# Patient Record
Sex: FEMALE | Race: WHITE | NOT HISPANIC OR LATINO | URBAN - METROPOLITAN AREA
[De-identification: names, ages, dates, MRNs, and addresses within clinical notes are randomized per-mention and may not be internally consistent; named-entity substitution may affect disease eponyms.]

---

## 2018-06-22 ENCOUNTER — OUTPATIENT (OUTPATIENT)
Dept: OUTPATIENT SERVICES | Facility: HOSPITAL | Age: 43
LOS: 1 days | Discharge: HOME | End: 2018-06-22
Payer: COMMERCIAL

## 2018-06-22 VITALS
HEART RATE: 68 BPM | HEIGHT: 67 IN | RESPIRATION RATE: 16 BRPM | DIASTOLIC BLOOD PRESSURE: 82 MMHG | SYSTOLIC BLOOD PRESSURE: 121 MMHG | WEIGHT: 229.94 LBS | OXYGEN SATURATION: 98 % | TEMPERATURE: 98 F

## 2018-06-22 DIAGNOSIS — Z98.890 OTHER SPECIFIED POSTPROCEDURAL STATES: Chronic | ICD-10-CM

## 2018-06-22 DIAGNOSIS — K21.9 GASTRO-ESOPHAGEAL REFLUX DISEASE WITHOUT ESOPHAGITIS: ICD-10-CM

## 2018-06-22 DIAGNOSIS — Z98.1 ARTHRODESIS STATUS: Chronic | ICD-10-CM

## 2018-06-22 DIAGNOSIS — Z90.710 ACQUIRED ABSENCE OF BOTH CERVIX AND UTERUS: Chronic | ICD-10-CM

## 2018-06-22 DIAGNOSIS — Z46.51 ENCOUNTER FOR FITTING AND ADJUSTMENT OF GASTRIC LAP BAND: Chronic | ICD-10-CM

## 2018-06-22 DIAGNOSIS — E66.01 MORBID (SEVERE) OBESITY DUE TO EXCESS CALORIES: ICD-10-CM

## 2018-06-22 LAB
ANION GAP SERPL CALC-SCNC: 4 MMOL/L — LOW (ref 5–17)
BASOPHILS # BLD AUTO: 0.08 K/UL — SIGNIFICANT CHANGE UP (ref 0–0.2)
BASOPHILS NFR BLD AUTO: 0.8 % — SIGNIFICANT CHANGE UP (ref 0–2)
BUN SERPL-MCNC: 14 MG/DL — SIGNIFICANT CHANGE UP (ref 7–23)
CALCIUM SERPL-MCNC: 8.9 MG/DL — SIGNIFICANT CHANGE UP (ref 8.5–10.1)
CHLORIDE SERPL-SCNC: 104 MMOL/L — SIGNIFICANT CHANGE UP (ref 96–108)
CO2 SERPL-SCNC: 32 MMOL/L — HIGH (ref 22–31)
CREAT SERPL-MCNC: 0.8 MG/DL — SIGNIFICANT CHANGE UP (ref 0.5–1.3)
EOSINOPHIL # BLD AUTO: 0.19 K/UL — SIGNIFICANT CHANGE UP (ref 0–0.5)
EOSINOPHIL NFR BLD AUTO: 2 % — SIGNIFICANT CHANGE UP (ref 0–6)
GLUCOSE SERPL-MCNC: 99 MG/DL — SIGNIFICANT CHANGE UP (ref 70–99)
HCT VFR BLD CALC: 40.1 % — SIGNIFICANT CHANGE UP (ref 34.5–45)
HGB BLD-MCNC: 13.3 G/DL — SIGNIFICANT CHANGE UP (ref 11.5–15.5)
IMM GRANULOCYTES NFR BLD AUTO: 0.8 % — SIGNIFICANT CHANGE UP (ref 0–1.5)
LYMPHOCYTES # BLD AUTO: 2.1 K/UL — SIGNIFICANT CHANGE UP (ref 1–3.3)
LYMPHOCYTES # BLD AUTO: 22.2 % — SIGNIFICANT CHANGE UP (ref 13–44)
MCHC RBC-ENTMCNC: 28.2 PG — SIGNIFICANT CHANGE UP (ref 27–34)
MCHC RBC-ENTMCNC: 33.2 GM/DL — SIGNIFICANT CHANGE UP (ref 32–36)
MCV RBC AUTO: 85 FL — SIGNIFICANT CHANGE UP (ref 80–100)
MONOCYTES # BLD AUTO: 0.44 K/UL — SIGNIFICANT CHANGE UP (ref 0–0.9)
MONOCYTES NFR BLD AUTO: 4.7 % — SIGNIFICANT CHANGE UP (ref 2–14)
NEUTROPHILS # BLD AUTO: 6.57 K/UL — SIGNIFICANT CHANGE UP (ref 1.8–7.4)
NEUTROPHILS NFR BLD AUTO: 69.5 % — SIGNIFICANT CHANGE UP (ref 43–77)
NRBC # BLD: 0 /100 WBCS — SIGNIFICANT CHANGE UP (ref 0–0)
PLATELET # BLD AUTO: 320 K/UL — SIGNIFICANT CHANGE UP (ref 150–400)
POTASSIUM SERPL-MCNC: 4.7 MMOL/L — SIGNIFICANT CHANGE UP (ref 3.5–5.3)
POTASSIUM SERPL-SCNC: 4.7 MMOL/L — SIGNIFICANT CHANGE UP (ref 3.5–5.3)
RBC # BLD: 4.72 M/UL — SIGNIFICANT CHANGE UP (ref 3.8–5.2)
RBC # FLD: 12.3 % — SIGNIFICANT CHANGE UP (ref 10.3–14.5)
SODIUM SERPL-SCNC: 140 MMOL/L — SIGNIFICANT CHANGE UP (ref 135–145)
WBC # BLD: 9.46 K/UL — SIGNIFICANT CHANGE UP (ref 3.8–10.5)
WBC # FLD AUTO: 9.46 K/UL — SIGNIFICANT CHANGE UP (ref 3.8–10.5)

## 2018-06-22 PROCEDURE — 93010 ELECTROCARDIOGRAM REPORT: CPT

## 2018-06-22 PROCEDURE — 71046 X-RAY EXAM CHEST 2 VIEWS: CPT | Mod: 26

## 2018-06-22 NOTE — H&P PST ADULT - PMH
Fibroids    GERD (gastroesophageal reflux disease)    Heart Murmur    Hypothyroidism    Morbid obesity

## 2018-06-22 NOTE — H&P PST ADULT - HISTORY OF PRESENT ILLNESS
42 year old female PMH of hypothyroid; Pt diagnosed morbid obesity presents to PST for upper endoscopy and biopsy

## 2018-06-22 NOTE — H&P PST ADULT - PSH
Admission for adjustment of gastric lap band  2007   Section 2006    H/O bilateral breast reduction surgery  with breast implants   H/O myomectomy  2001  H/O spinal fusion  10/2017  H/O: hysterectomy  2014 Admission for adjustment of gastric lap band  2007   Section 2006    H/O abdominoplasty    H/O bilateral breast reduction surgery  with breast implants   H/O myomectomy  2001  H/O spinal fusion  10/2017  H/O: hysterectomy  2014

## 2018-06-22 NOTE — H&P PST ADULT - NSANTHOSAYNRD_GEN_A_CORE
No. ROSINA screening performed.  STOP BANG Legend: 0-2 = LOW Risk; 3-4 = INTERMEDIATE Risk; 5-8 = HIGH Risk

## 2018-06-22 NOTE — H&P PST ADULT - ASSESSMENT
42 year old female presents to PST for upper endoscopy  1. Endoscopy/ Dr Shirley office will give pt instructions for procedure

## 2018-06-29 RX ORDER — SODIUM CHLORIDE 9 MG/ML
1000 INJECTION, SOLUTION INTRAVENOUS
Qty: 0 | Refills: 0 | Status: DISCONTINUED | OUTPATIENT
Start: 2018-07-02 | End: 2018-07-17

## 2018-06-29 RX ORDER — ONDANSETRON 8 MG/1
4 TABLET, FILM COATED ORAL ONCE
Qty: 0 | Refills: 0 | Status: DISCONTINUED | OUTPATIENT
Start: 2018-07-02 | End: 2018-07-17

## 2018-06-29 RX ORDER — FENTANYL CITRATE 50 UG/ML
50 INJECTION INTRAVENOUS
Qty: 0 | Refills: 0 | Status: DISCONTINUED | OUTPATIENT
Start: 2018-07-02 | End: 2018-07-02

## 2018-06-29 RX ORDER — OXYCODONE HYDROCHLORIDE 5 MG/1
10 TABLET ORAL EVERY 6 HOURS
Qty: 0 | Refills: 0 | Status: DISCONTINUED | OUTPATIENT
Start: 2018-07-02 | End: 2018-07-02

## 2018-07-02 ENCOUNTER — OUTPATIENT (OUTPATIENT)
Dept: OUTPATIENT SERVICES | Facility: HOSPITAL | Age: 43
LOS: 1 days | Discharge: HOME | End: 2018-07-02
Payer: COMMERCIAL

## 2018-07-02 ENCOUNTER — RESULT REVIEW (OUTPATIENT)
Age: 43
End: 2018-07-02

## 2018-07-02 VITALS
OXYGEN SATURATION: 97 % | RESPIRATION RATE: 15 BRPM | SYSTOLIC BLOOD PRESSURE: 135 MMHG | TEMPERATURE: 98 F | DIASTOLIC BLOOD PRESSURE: 83 MMHG | HEART RATE: 89 BPM

## 2018-07-02 VITALS
SYSTOLIC BLOOD PRESSURE: 121 MMHG | OXYGEN SATURATION: 98 % | HEIGHT: 67 IN | HEART RATE: 81 BPM | DIASTOLIC BLOOD PRESSURE: 76 MMHG | TEMPERATURE: 98 F | WEIGHT: 229.06 LBS | RESPIRATION RATE: 15 BRPM
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
## 2018-07-02 DIAGNOSIS — K29.50 UNSPECIFIED CHRONIC GASTRITIS WITHOUT BLEEDING: ICD-10-CM

## 2018-07-02 DIAGNOSIS — Z98.82 BREAST IMPLANT STATUS: ICD-10-CM

## 2018-07-02 DIAGNOSIS — Z98.890 OTHER SPECIFIED POSTPROCEDURAL STATES: Chronic | ICD-10-CM

## 2018-07-02 DIAGNOSIS — Z98.84 BARIATRIC SURGERY STATUS: ICD-10-CM

## 2018-07-02 DIAGNOSIS — R01.1 CARDIAC MURMUR, UNSPECIFIED: ICD-10-CM

## 2018-07-02 DIAGNOSIS — E66.01 MORBID (SEVERE) OBESITY DUE TO EXCESS CALORIES: ICD-10-CM

## 2018-07-02 DIAGNOSIS — K22.10 ULCER OF ESOPHAGUS WITHOUT BLEEDING: ICD-10-CM

## 2018-07-02 DIAGNOSIS — Z98.1 ARTHRODESIS STATUS: ICD-10-CM

## 2018-07-02 DIAGNOSIS — Z90.710 ACQUIRED ABSENCE OF BOTH CERVIX AND UTERUS: Chronic | ICD-10-CM

## 2018-07-02 DIAGNOSIS — K21.9 GASTRO-ESOPHAGEAL REFLUX DISEASE WITHOUT ESOPHAGITIS: ICD-10-CM
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
## 2018-07-02 DIAGNOSIS — E03.9 HYPOTHYROIDISM, UNSPECIFIED: ICD-10-CM

## 2018-07-02 DIAGNOSIS — Z46.51 ENCOUNTER FOR FITTING AND ADJUSTMENT OF GASTRIC LAP BAND: Chronic | ICD-10-CM

## 2018-07-02 DIAGNOSIS — Z98.1 ARTHRODESIS STATUS: Chronic | ICD-10-CM

## 2018-07-02 DIAGNOSIS — Z90.710 ACQUIRED ABSENCE OF BOTH CERVIX AND UTERUS: ICD-10-CM

## 2018-07-02 DIAGNOSIS — K95.09 OTHER COMPLICATIONS OF GASTRIC BAND PROCEDURE: ICD-10-CM

## 2018-07-02 LAB
ALLERGY+IMMUNOLOGY DIAG STUDY NOTE: SIGNIFICANT CHANGE UP
HCG UR QL: NEGATIVE — SIGNIFICANT CHANGE UP

## 2018-07-02 PROCEDURE — 43774 LAP RMVL GASTR ADJ ALL PARTS: CPT | Mod: AS

## 2018-07-02 PROCEDURE — 88313 SPECIAL STAINS GROUP 2: CPT | Mod: 26

## 2018-07-02 PROCEDURE — 88305 TISSUE EXAM BY PATHOLOGIST: CPT | Mod: 26

## 2018-07-02 PROCEDURE — 88312 SPECIAL STAINS GROUP 1: CPT | Mod: 26

## 2018-07-02 RX ADMIN — FENTANYL CITRATE 50 MICROGRAM(S): 50 INJECTION INTRAVENOUS at 12:11

## 2018-07-02 RX ADMIN — FENTANYL CITRATE 50 MICROGRAM(S): 50 INJECTION INTRAVENOUS at 11:59

## 2018-07-02 NOTE — ASU DISCHARGE PLAN (ADULT/PEDIATRIC). - MEDICATION SUMMARY - MEDICATIONS TO TAKE
I will START or STAY ON the medications listed below when I get home from the hospital:    Tums 500 mg oral tablet, chewable  -- 1 tab(s) by mouth once a day, As Needed  -- Indication: For home medication    Mylanta oral suspension  -- 10 milliliter(s) by mouth 4 times a day (after meals and at bedtime), As Needed  -- Indication: For home medication    Synthroid 175 mcg (0.175 mg) oral tablet  -- 1 tab(s) by mouth once a day  -- Indication: For home medication

## 2018-07-02 NOTE — BRIEF OPERATIVE NOTE - PROCEDURE
<<-----Click on this checkbox to enter Procedure Endoscopy  07/02/2018    Active  ENASTRO  Laparoscopic removal of gastric band and port  07/02/2018    Active  ENASTRO

## 2018-07-02 NOTE — ASU PATIENT PROFILE, ADULT - VISION (WITH CORRECTIVE LENSES IF THE PATIENT USUALLY WEARS THEM):
Progressives/Partially impaired: cannot see medication labels or newsprint, but can see obstacles in path, and the surrounding layout; can count fingers at arm's length

## 2018-07-02 NOTE — ASU DISCHARGE PLAN (ADULT/PEDIATRIC). - NURSING INSTRUCTIONS
For any problems or concerns, contact your doctor.  If you cannot reach the doctor, call [facility] Emergency Department at [phone] or go to your local Emergency Department.    A responsible adult should be with you for the rest of the day and night for your safety and to help you if you needed. Resume your medications as listed on the attached Medication Record.    Use over the counter stool softener to relieve any constipation you may experience from the pain medication. Increase clear fluids for the next few days. Ambulation is a key part of recovery, so keep moving!

## 2018-07-02 NOTE — BRIEF OPERATIVE NOTE - PRE-OP DX
Dysphagia, unspecified type  07/02/2018  with noted possible slippage of gastric band  Active  Radha Butterfield

## 2018-07-02 NOTE — ASU PATIENT PROFILE, ADULT - PSH
Admission for adjustment of gastric lap band  2007   Section 2006    H/O abdominoplasty    H/O bilateral breast reduction surgery  with breast implants   H/O myomectomy  2001  H/O spinal fusion  10/2017  H/O: hysterectomy  2014

## 2018-07-05 LAB — SURGICAL PATHOLOGY FINAL REPORT - CH: SIGNIFICANT CHANGE UP

## 2018-10-29 PROBLEM — K21.9 GASTRO-ESOPHAGEAL REFLUX DISEASE WITHOUT ESOPHAGITIS: Chronic | Status: ACTIVE | Noted: 2018-06-22

## 2018-11-09 ENCOUNTER — OUTPATIENT (OUTPATIENT)
Dept: OUTPATIENT SERVICES | Facility: HOSPITAL | Age: 43
LOS: 1 days | Discharge: HOME | End: 2018-11-09

## 2018-11-09 VITALS
WEIGHT: 272.05 LBS | RESPIRATION RATE: 16 BRPM | OXYGEN SATURATION: 98 % | TEMPERATURE: 99 F | SYSTOLIC BLOOD PRESSURE: 132 MMHG | HEIGHT: 67 IN | HEART RATE: 78 BPM | DIASTOLIC BLOOD PRESSURE: 78 MMHG

## 2018-11-09 DIAGNOSIS — Z98.890 OTHER SPECIFIED POSTPROCEDURAL STATES: Chronic | ICD-10-CM

## 2018-11-09 DIAGNOSIS — Z46.51 ENCOUNTER FOR FITTING AND ADJUSTMENT OF GASTRIC LAP BAND: Chronic | ICD-10-CM

## 2018-11-09 DIAGNOSIS — Z29.9 ENCOUNTER FOR PROPHYLACTIC MEASURES, UNSPECIFIED: ICD-10-CM

## 2018-11-09 DIAGNOSIS — Z98.1 ARTHRODESIS STATUS: Chronic | ICD-10-CM

## 2018-11-09 DIAGNOSIS — Z90.710 ACQUIRED ABSENCE OF BOTH CERVIX AND UTERUS: Chronic | ICD-10-CM

## 2018-11-09 DIAGNOSIS — E66.01 MORBID (SEVERE) OBESITY DUE TO EXCESS CALORIES: ICD-10-CM

## 2018-11-09 LAB
ALBUMIN SERPL ELPH-MCNC: 4.1 G/DL — SIGNIFICANT CHANGE UP (ref 3.3–5)
ANION GAP SERPL CALC-SCNC: 8 MMOL/L — SIGNIFICANT CHANGE UP (ref 5–17)
APPEARANCE UR: CLEAR — SIGNIFICANT CHANGE UP
APTT BLD: 33.6 SEC — SIGNIFICANT CHANGE UP (ref 27.5–36.3)
BACTERIA # UR AUTO: ABNORMAL
BASOPHILS # BLD AUTO: 0.07 K/UL — SIGNIFICANT CHANGE UP (ref 0–0.2)
BASOPHILS NFR BLD AUTO: 0.7 % — SIGNIFICANT CHANGE UP (ref 0–2)
BILIRUB UR-MCNC: NEGATIVE — SIGNIFICANT CHANGE UP
BLD GP AB SCN SERPL QL: SIGNIFICANT CHANGE UP
BUN SERPL-MCNC: 17 MG/DL — SIGNIFICANT CHANGE UP (ref 7–23)
CALCIUM SERPL-MCNC: 9.6 MG/DL — SIGNIFICANT CHANGE UP (ref 8.5–10.1)
CHLORIDE SERPL-SCNC: 103 MMOL/L — SIGNIFICANT CHANGE UP (ref 96–108)
CO2 SERPL-SCNC: 28 MMOL/L — SIGNIFICANT CHANGE UP (ref 22–31)
COHGB MFR BLDV: 2.4 % — HIGH (ref 0–1.5)
COLOR SPEC: YELLOW — SIGNIFICANT CHANGE UP
CREAT SERPL-MCNC: 0.74 MG/DL — SIGNIFICANT CHANGE UP (ref 0.5–1.3)
DIFF PNL FLD: ABNORMAL
EOSINOPHIL # BLD AUTO: 0.18 K/UL — SIGNIFICANT CHANGE UP (ref 0–0.5)
EOSINOPHIL NFR BLD AUTO: 1.8 % — SIGNIFICANT CHANGE UP (ref 0–6)
EPI CELLS # UR: SIGNIFICANT CHANGE UP
GLUCOSE SERPL-MCNC: 103 MG/DL — HIGH (ref 70–99)
GLUCOSE UR QL: NEGATIVE MG/DL — SIGNIFICANT CHANGE UP
HCT VFR BLD CALC: 39.2 % — SIGNIFICANT CHANGE UP (ref 34.5–45)
HGB BLD-MCNC: 12.9 G/DL — SIGNIFICANT CHANGE UP (ref 11.5–15.5)
IMM GRANULOCYTES NFR BLD AUTO: 0.7 % — SIGNIFICANT CHANGE UP (ref 0–1.5)
INR BLD: 0.99 RATIO — SIGNIFICANT CHANGE UP (ref 0.88–1.16)
KETONES UR-MCNC: NEGATIVE — SIGNIFICANT CHANGE UP
LEUKOCYTE ESTERASE UR-ACNC: NEGATIVE — SIGNIFICANT CHANGE UP
LYMPHOCYTES # BLD AUTO: 2.02 K/UL — SIGNIFICANT CHANGE UP (ref 1–3.3)
LYMPHOCYTES # BLD AUTO: 20.8 % — SIGNIFICANT CHANGE UP (ref 13–44)
MCHC RBC-ENTMCNC: 28.1 PG — SIGNIFICANT CHANGE UP (ref 27–34)
MCHC RBC-ENTMCNC: 32.9 GM/DL — SIGNIFICANT CHANGE UP (ref 32–36)
MCV RBC AUTO: 85.4 FL — SIGNIFICANT CHANGE UP (ref 80–100)
MONOCYTES # BLD AUTO: 0.55 K/UL — SIGNIFICANT CHANGE UP (ref 0–0.9)
MONOCYTES NFR BLD AUTO: 5.7 % — SIGNIFICANT CHANGE UP (ref 2–14)
NEUTROPHILS # BLD AUTO: 6.84 K/UL — SIGNIFICANT CHANGE UP (ref 1.8–7.4)
NEUTROPHILS NFR BLD AUTO: 70.3 % — SIGNIFICANT CHANGE UP (ref 43–77)
NITRITE UR-MCNC: NEGATIVE — SIGNIFICANT CHANGE UP
NRBC # BLD: 0 /100 WBCS — SIGNIFICANT CHANGE UP (ref 0–0)
PH UR: 6.5 — SIGNIFICANT CHANGE UP (ref 5–8)
PLATELET # BLD AUTO: 290 K/UL — SIGNIFICANT CHANGE UP (ref 150–400)
POTASSIUM SERPL-MCNC: 4.7 MMOL/L — SIGNIFICANT CHANGE UP (ref 3.5–5.3)
POTASSIUM SERPL-SCNC: 4.7 MMOL/L — SIGNIFICANT CHANGE UP (ref 3.5–5.3)
PROT UR-MCNC: NEGATIVE MG/DL — SIGNIFICANT CHANGE UP
PROTHROM AB SERPL-ACNC: 11 SEC — SIGNIFICANT CHANGE UP (ref 10–12.9)
RBC # BLD: 4.59 M/UL — SIGNIFICANT CHANGE UP (ref 3.8–5.2)
RBC # FLD: 12.6 % — SIGNIFICANT CHANGE UP (ref 10.3–14.5)
RBC CASTS # UR COMP ASSIST: ABNORMAL /HPF (ref 0–4)
SODIUM SERPL-SCNC: 139 MMOL/L — SIGNIFICANT CHANGE UP (ref 135–145)
SP GR SPEC: 1.01 — SIGNIFICANT CHANGE UP (ref 1.01–1.02)
TYPE + AB SCN PNL BLD: SIGNIFICANT CHANGE UP
UROBILINOGEN FLD QL: NEGATIVE MG/DL — SIGNIFICANT CHANGE UP
WBC # BLD: 9.73 K/UL — SIGNIFICANT CHANGE UP (ref 3.8–10.5)
WBC # FLD AUTO: 9.73 K/UL — SIGNIFICANT CHANGE UP (ref 3.8–10.5)
WBC UR QL: SIGNIFICANT CHANGE UP

## 2018-11-09 NOTE — H&P PST ADULT - ASSESSMENT
43 year old female presents to PST for laparoscopic sleeve gastrectomy liver biopsy intraoperative endoscopy     Plan:  1. PST instructions given ; NPO post midnight   2. Pt instructed to take following meds with sip of water : synthroid   3. EZ wash instructions given   4. Medical Evaluation with Dr Moynihan CAPRINI SCORE [CLOT]    AGE RELATED RISK FACTORS                                                       MOBILITY RELATED FACTORS  [ x] Age 41-60 years                                            (1 Point)                  [ ] Bed rest                                                        (1 Point)  [ ] Age: 61-74 years                                           (2 Points)                 [ ] Plaster cast                                                   (2 Points)  [ ] Age= 75 years                                              (3 Points)                 [ ] Bed bound for more than 72 hours                 (2 Points)    DISEASE RELATED RISK FACTORS                                               GENDER SPECIFIC FACTORS  [ ] Edema in the lower extremities                       (1 Point)                  [ ] Pregnancy                                                     (1 Point)  [ ] Varicose veins                                               (1 Point)                  [ ] Post-partum < 6 weeks                                   (1 Point)             [ x] BMI > 25 Kg/m2                                            (1 Point)                  [ ] Hormonal therapy  or oral contraception          (1 Point)                 [ ] Sepsis (in the previous month)                        (1 Point)                  [ ] History of pregnancy complications                 (1 point)  [ ] Pneumonia or serious lung disease                                               [ ] Unexplained or recurrent                     (1 Point)           (in the previous month)                               (1 Point)  [ ] Abnormal pulmonary function test                     (1 Point)                 SURGERY RELATED RISK FACTORS  [ ] Acute myocardial infarction                              (1 Point)                 [ ]  Section                                             (1 Point)  [ ] Congestive heart failure (in the previous month)  (1 Point)               [ ] Minor surgery                                                  (1 Point)   [ ] Inflammatory bowel disease                             (1 Point)                 [ ] Arthroscopic surgery                                        (2 Points)  [ ] Central venous access                                      (2 Points)                [x ] General surgery lasting more than 45 minutes   (2 Points)       [ ] Stroke (in the previous month)                          (5 Points)               [ ] Elective arthroplasty                                         (5 Points)            ( ) past and present malignancy                             ( 2 points)                                                                                                                                    HEMATOLOGY RELATED FACTORS                                                 TRAUMA RELATED RISK FACTORS  [ ] Prior episodes of VTE                                     (3 Points)                 [ ] Fracture of the hip, pelvis, or leg                       (5 Points)  [ ] Positive family history for VTE                         (3 Points)                 [ ] Acute spinal cord injury (in the previous month)  (5 Points)  [ ] Prothrombin 44338 A                                     (3 Points)                 [ ] Paralysis  (less than 1 month)                             (5 Points)  [ ] Factor V Leiden                                             (3 Points)                  [ ] Multiple Trauma within 1 month                        (5 Points)  [ ] Lupus anticoagulants                                     (3 Points)                                                           [ ] Anticardiolipin antibodies                               (3 Points)                                                       [ ] High homocysteine in the blood                      (3 Points)                                             [ ] Other congenital or acquired thrombophilia      (3 Points)                                                [ ] Heparin induced thrombocytopenia                  (3 Points)                                          Total Score [        4  ]

## 2018-11-09 NOTE — H&P PST ADULT - PSH
Admission for adjustment of gastric lap band  2007 removal 2018   Section     H/O abdominoplasty    H/O bilateral breast reduction surgery  with breast implants  silicone  H/O myomectomy  2001  H/O spinal fusion  10/2017  H/O: hysterectomy  2014 fibroids

## 2018-11-09 NOTE — H&P PST ADULT - HISTORY OF PRESENT ILLNESS
43 year old female PMH of hypothyroid; Pt diagnosed morbid obesity presents to PST for laparoscopic sleeve gastrectomy liver biopsy intraoperative endoscopy

## 2018-11-16 RX ORDER — NALOXONE HYDROCHLORIDE 4 MG/.1ML
0.1 SPRAY NASAL
Qty: 0 | Refills: 0 | Status: DISCONTINUED | OUTPATIENT
Start: 2018-11-19 | End: 2018-11-21

## 2018-11-16 RX ORDER — HYDROMORPHONE HYDROCHLORIDE 2 MG/ML
30 INJECTION INTRAMUSCULAR; INTRAVENOUS; SUBCUTANEOUS
Qty: 0 | Refills: 0 | Status: DISCONTINUED | OUTPATIENT
Start: 2018-11-19 | End: 2018-11-20

## 2018-11-16 RX ORDER — HYDROMORPHONE HYDROCHLORIDE 2 MG/ML
0.5 INJECTION INTRAMUSCULAR; INTRAVENOUS; SUBCUTANEOUS
Qty: 0 | Refills: 0 | Status: DISCONTINUED | OUTPATIENT
Start: 2018-11-19 | End: 2018-11-20

## 2018-11-16 RX ORDER — ONDANSETRON 8 MG/1
4 TABLET, FILM COATED ORAL EVERY 6 HOURS
Qty: 0 | Refills: 0 | Status: DISCONTINUED | OUTPATIENT
Start: 2018-11-19 | End: 2018-11-21

## 2018-11-16 RX ORDER — SODIUM CHLORIDE 9 MG/ML
1000 INJECTION, SOLUTION INTRAVENOUS
Qty: 0 | Refills: 0 | Status: DISCONTINUED | OUTPATIENT
Start: 2018-11-19 | End: 2018-11-19

## 2018-11-19 ENCOUNTER — RESULT REVIEW (OUTPATIENT)
Age: 43
End: 2018-11-19

## 2018-11-19 ENCOUNTER — INPATIENT (INPATIENT)
Facility: HOSPITAL | Age: 43
LOS: 1 days | Discharge: HOME | End: 2018-11-21
Attending: SURGERY | Admitting: SURGERY
Payer: COMMERCIAL

## 2018-11-19 VITALS
HEART RATE: 71 BPM | DIASTOLIC BLOOD PRESSURE: 76 MMHG | RESPIRATION RATE: 16 BRPM | WEIGHT: 261.91 LBS | TEMPERATURE: 99 F | HEIGHT: 67 IN | SYSTOLIC BLOOD PRESSURE: 129 MMHG | OXYGEN SATURATION: 99 %

## 2018-11-19 DIAGNOSIS — Z90.710 ACQUIRED ABSENCE OF BOTH CERVIX AND UTERUS: Chronic | ICD-10-CM

## 2018-11-19 DIAGNOSIS — Z46.51 ENCOUNTER FOR FITTING AND ADJUSTMENT OF GASTRIC LAP BAND: Chronic | ICD-10-CM

## 2018-11-19 DIAGNOSIS — Z98.890 OTHER SPECIFIED POSTPROCEDURAL STATES: Chronic | ICD-10-CM

## 2018-11-19 DIAGNOSIS — Z98.1 ARTHRODESIS STATUS: Chronic | ICD-10-CM

## 2018-11-19 PROCEDURE — 88305 TISSUE EXAM BY PATHOLOGIST: CPT | Mod: 26

## 2018-11-19 RX ORDER — HEPARIN SODIUM 5000 [USP'U]/ML
5000 INJECTION INTRAVENOUS; SUBCUTANEOUS ONCE
Qty: 0 | Refills: 0 | Status: COMPLETED | OUTPATIENT
Start: 2018-11-19 | End: 2018-11-19

## 2018-11-19 RX ORDER — OXYCODONE HYDROCHLORIDE 5 MG/1
10 TABLET ORAL ONCE
Qty: 0 | Refills: 0 | Status: DISCONTINUED | OUTPATIENT
Start: 2018-11-19 | End: 2018-11-19

## 2018-11-19 RX ORDER — ENOXAPARIN SODIUM 100 MG/ML
40 INJECTION SUBCUTANEOUS EVERY 12 HOURS
Qty: 0 | Refills: 0 | Status: DISCONTINUED | OUTPATIENT
Start: 2018-11-19 | End: 2018-11-21

## 2018-11-19 RX ORDER — LEVOTHYROXINE SODIUM 125 MCG
90 TABLET ORAL AT BEDTIME
Qty: 0 | Refills: 0 | Status: DISCONTINUED | OUTPATIENT
Start: 2018-11-19 | End: 2018-11-21

## 2018-11-19 RX ORDER — PANTOPRAZOLE SODIUM 20 MG/1
40 TABLET, DELAYED RELEASE ORAL EVERY 24 HOURS
Qty: 0 | Refills: 0 | Status: DISCONTINUED | OUTPATIENT
Start: 2018-11-19 | End: 2018-11-21

## 2018-11-19 RX ORDER — SODIUM CHLORIDE 9 MG/ML
1000 INJECTION, SOLUTION INTRAVENOUS
Qty: 0 | Refills: 0 | Status: DISCONTINUED | OUTPATIENT
Start: 2018-11-19 | End: 2018-11-21

## 2018-11-19 RX ORDER — APREPITANT 80 MG/1
40 CAPSULE ORAL ONCE
Qty: 0 | Refills: 0 | Status: COMPLETED | OUTPATIENT
Start: 2018-11-19 | End: 2018-11-19

## 2018-11-19 RX ADMIN — HEPARIN SODIUM 5000 UNIT(S): 5000 INJECTION INTRAVENOUS; SUBCUTANEOUS at 06:41

## 2018-11-19 RX ADMIN — HYDROMORPHONE HYDROCHLORIDE 0.5 MILLIGRAM(S): 2 INJECTION INTRAMUSCULAR; INTRAVENOUS; SUBCUTANEOUS at 10:27

## 2018-11-19 RX ADMIN — OXYCODONE HYDROCHLORIDE 10 MILLIGRAM(S): 5 TABLET ORAL at 06:45

## 2018-11-19 RX ADMIN — HYDROMORPHONE HYDROCHLORIDE 30 MILLILITER(S): 2 INJECTION INTRAMUSCULAR; INTRAVENOUS; SUBCUTANEOUS at 10:07

## 2018-11-19 RX ADMIN — SODIUM CHLORIDE 150 MILLILITER(S): 9 INJECTION, SOLUTION INTRAVENOUS at 22:51

## 2018-11-19 RX ADMIN — PANTOPRAZOLE SODIUM 40 MILLIGRAM(S): 20 TABLET, DELAYED RELEASE ORAL at 10:11

## 2018-11-19 RX ADMIN — ENOXAPARIN SODIUM 40 MILLIGRAM(S): 100 INJECTION SUBCUTANEOUS at 17:48

## 2018-11-19 RX ADMIN — SODIUM CHLORIDE 150 MILLILITER(S): 9 INJECTION, SOLUTION INTRAVENOUS at 10:12

## 2018-11-19 RX ADMIN — HYDROMORPHONE HYDROCHLORIDE 0.5 MILLIGRAM(S): 2 INJECTION INTRAMUSCULAR; INTRAVENOUS; SUBCUTANEOUS at 10:14

## 2018-11-19 RX ADMIN — OXYCODONE HYDROCHLORIDE 10 MILLIGRAM(S): 5 TABLET ORAL at 06:42

## 2018-11-19 RX ADMIN — SODIUM CHLORIDE 150 MILLILITER(S): 9 INJECTION, SOLUTION INTRAVENOUS at 17:53

## 2018-11-19 RX ADMIN — APREPITANT 40 MILLIGRAM(S): 80 CAPSULE ORAL at 06:41

## 2018-11-19 NOTE — BRIEF OPERATIVE NOTE - POST-OP DX
Hiatal hernia  11/19/2018    Hemal Saez  Obesity, Class III, BMI 40-49.9 (morbid obesity)  11/19/2018    Hemal Saez

## 2018-11-19 NOTE — BRIEF OPERATIVE NOTE - PROCEDURE
<<-----Click on this checkbox to enter Procedure EGD (esophagogastroduodenoscopy)  11/19/2018    Active  MAICOL  Hiatal hernia repair  11/19/2018    Active  MAICOL  Gastrectomy, laparoscopic sleeve  11/19/2018    Active  AGODWIN

## 2018-11-19 NOTE — BRIEF OPERATIVE NOTE - OPERATION/FINDINGS
Patient underwent laparoscopic vertical sleeve gastrectomy over 40 [place] bougie with primary repair of hiatal hernia without any complications. Intraoperative EGD revealed intact nonbleeding staple line with negative leak test.

## 2018-11-20 DIAGNOSIS — E66.01 MORBID (SEVERE) OBESITY DUE TO EXCESS CALORIES: ICD-10-CM

## 2018-11-20 LAB
ANION GAP SERPL CALC-SCNC: 7 MMOL/L — SIGNIFICANT CHANGE UP (ref 5–17)
BASOPHILS # BLD AUTO: 0.03 K/UL — SIGNIFICANT CHANGE UP (ref 0–0.2)
BASOPHILS NFR BLD AUTO: 0.3 % — SIGNIFICANT CHANGE UP (ref 0–2)
BUN SERPL-MCNC: 7 MG/DL — SIGNIFICANT CHANGE UP (ref 7–23)
CALCIUM SERPL-MCNC: 8.3 MG/DL — LOW (ref 8.5–10.1)
CHLORIDE SERPL-SCNC: 104 MMOL/L — SIGNIFICANT CHANGE UP (ref 96–108)
CO2 SERPL-SCNC: 28 MMOL/L — SIGNIFICANT CHANGE UP (ref 22–31)
CREAT SERPL-MCNC: 0.66 MG/DL — SIGNIFICANT CHANGE UP (ref 0.5–1.3)
EOSINOPHIL # BLD AUTO: 0.03 K/UL — SIGNIFICANT CHANGE UP (ref 0–0.5)
EOSINOPHIL NFR BLD AUTO: 0.3 % — SIGNIFICANT CHANGE UP (ref 0–6)
GLUCOSE SERPL-MCNC: 96 MG/DL — SIGNIFICANT CHANGE UP (ref 70–99)
HCT VFR BLD CALC: 34.4 % — LOW (ref 34.5–45)
HGB BLD-MCNC: 11.2 G/DL — LOW (ref 11.5–15.5)
IMM GRANULOCYTES NFR BLD AUTO: 0.5 % — SIGNIFICANT CHANGE UP (ref 0–1.5)
LYMPHOCYTES # BLD AUTO: 1.7 K/UL — SIGNIFICANT CHANGE UP (ref 1–3.3)
LYMPHOCYTES # BLD AUTO: 16.9 % — SIGNIFICANT CHANGE UP (ref 13–44)
MCHC RBC-ENTMCNC: 28.2 PG — SIGNIFICANT CHANGE UP (ref 27–34)
MCHC RBC-ENTMCNC: 32.6 GM/DL — SIGNIFICANT CHANGE UP (ref 32–36)
MCV RBC AUTO: 86.6 FL — SIGNIFICANT CHANGE UP (ref 80–100)
MONOCYTES # BLD AUTO: 0.72 K/UL — SIGNIFICANT CHANGE UP (ref 0–0.9)
MONOCYTES NFR BLD AUTO: 7.1 % — SIGNIFICANT CHANGE UP (ref 2–14)
NEUTROPHILS # BLD AUTO: 7.54 K/UL — HIGH (ref 1.8–7.4)
NEUTROPHILS NFR BLD AUTO: 74.9 % — SIGNIFICANT CHANGE UP (ref 43–77)
NRBC # BLD: 0 /100 WBCS — SIGNIFICANT CHANGE UP (ref 0–0)
PLATELET # BLD AUTO: 226 K/UL — SIGNIFICANT CHANGE UP (ref 150–400)
POTASSIUM SERPL-MCNC: 3.8 MMOL/L — SIGNIFICANT CHANGE UP (ref 3.5–5.3)
POTASSIUM SERPL-SCNC: 3.8 MMOL/L — SIGNIFICANT CHANGE UP (ref 3.5–5.3)
RBC # BLD: 3.97 M/UL — SIGNIFICANT CHANGE UP (ref 3.8–5.2)
RBC # FLD: 12.5 % — SIGNIFICANT CHANGE UP (ref 10.3–14.5)
SODIUM SERPL-SCNC: 139 MMOL/L — SIGNIFICANT CHANGE UP (ref 135–145)
WBC # BLD: 10.07 K/UL — SIGNIFICANT CHANGE UP (ref 3.8–10.5)
WBC # FLD AUTO: 10.07 K/UL — SIGNIFICANT CHANGE UP (ref 3.8–10.5)

## 2018-11-20 PROCEDURE — 74241: CPT | Mod: 26

## 2018-11-20 RX ORDER — KETOROLAC TROMETHAMINE 30 MG/ML
30 SYRINGE (ML) INJECTION EVERY 6 HOURS
Qty: 0 | Refills: 0 | Status: DISCONTINUED | OUTPATIENT
Start: 2018-11-20 | End: 2018-11-21

## 2018-11-20 RX ORDER — OXYCODONE HYDROCHLORIDE 5 MG/1
5 TABLET ORAL EVERY 4 HOURS
Qty: 0 | Refills: 0 | Status: DISCONTINUED | OUTPATIENT
Start: 2018-11-20 | End: 2018-11-21

## 2018-11-20 RX ORDER — OXYCODONE HYDROCHLORIDE 5 MG/1
10 TABLET ORAL EVERY 4 HOURS
Qty: 0 | Refills: 0 | Status: DISCONTINUED | OUTPATIENT
Start: 2018-11-20 | End: 2018-11-21

## 2018-11-20 RX ADMIN — SODIUM CHLORIDE 150 MILLILITER(S): 9 INJECTION, SOLUTION INTRAVENOUS at 23:34

## 2018-11-20 RX ADMIN — SODIUM CHLORIDE 150 MILLILITER(S): 9 INJECTION, SOLUTION INTRAVENOUS at 17:17

## 2018-11-20 RX ADMIN — ENOXAPARIN SODIUM 40 MILLIGRAM(S): 100 INJECTION SUBCUTANEOUS at 05:59

## 2018-11-20 RX ADMIN — OXYCODONE HYDROCHLORIDE 10 MILLIGRAM(S): 5 TABLET ORAL at 22:23

## 2018-11-20 RX ADMIN — Medication 90 MICROGRAM(S): at 05:59

## 2018-11-20 RX ADMIN — SODIUM CHLORIDE 150 MILLILITER(S): 9 INJECTION, SOLUTION INTRAVENOUS at 05:57

## 2018-11-20 RX ADMIN — OXYCODONE HYDROCHLORIDE 10 MILLIGRAM(S): 5 TABLET ORAL at 16:05

## 2018-11-20 RX ADMIN — Medication 30 MILLIGRAM(S): at 23:33

## 2018-11-20 RX ADMIN — Medication 30 MILLIGRAM(S): at 17:15

## 2018-11-20 RX ADMIN — OXYCODONE HYDROCHLORIDE 10 MILLIGRAM(S): 5 TABLET ORAL at 23:00

## 2018-11-20 RX ADMIN — ENOXAPARIN SODIUM 40 MILLIGRAM(S): 100 INJECTION SUBCUTANEOUS at 17:15

## 2018-11-20 RX ADMIN — PANTOPRAZOLE SODIUM 40 MILLIGRAM(S): 20 TABLET, DELAYED RELEASE ORAL at 11:18

## 2018-11-20 RX ADMIN — Medication 30 MILLIGRAM(S): at 11:11

## 2018-11-20 RX ADMIN — SODIUM CHLORIDE 150 MILLILITER(S): 9 INJECTION, SOLUTION INTRAVENOUS at 11:11

## 2018-11-20 NOTE — PROGRESS NOTE ADULT - SUBJECTIVE AND OBJECTIVE BOX
Post Op Day#: 1  Procedure:  Laparoscopic Sleeve Gastrectomy    The patient is doing well without complaints.    Vital Signs Last 24 Hrs  T(C): 37.2 (20 Nov 2018 00:59), Max: 37.4 (19 Nov 2018 10:58)  T(F): 98.9 (20 Nov 2018 00:59), Max: 99.3 (19 Nov 2018 10:58)  HR: 92 (20 Nov 2018 00:59) (67 - 102)  BP: 113/68 (20 Nov 2018 00:59) (113/68 - 134/75)  BP(mean): --  RR: 18 (20 Nov 2018 00:59) (15 - 18)  SpO2: 98% (20 Nov 2018 00:59) (96% - 100%)    PHYSICAL EXAM:  General: NAD.  HEENT: no JVD, no jaundice.  LUNGS: CTAB.  Heart: S1 S2 RRR  Abd: soft nt/nd   Wounds: clean dry and intact                          11.2   10.07 )-----------( 226      ( 20 Nov 2018 08:02 )             34.4       11-20    139  |  104  |  7   ----------------------------<  96  3.8   |  28  |  0.66    Ca    8.3<L>      20 Nov 2018 08:02

## 2018-11-21 ENCOUNTER — TRANSCRIPTION ENCOUNTER (OUTPATIENT)
Age: 43
End: 2018-11-21

## 2018-11-21 VITALS
OXYGEN SATURATION: 99 % | SYSTOLIC BLOOD PRESSURE: 100 MMHG | DIASTOLIC BLOOD PRESSURE: 58 MMHG | TEMPERATURE: 98 F | HEART RATE: 79 BPM | RESPIRATION RATE: 18 BRPM

## 2018-11-21 RX ADMIN — Medication 30 MILLIGRAM(S): at 05:08

## 2018-11-21 RX ADMIN — ENOXAPARIN SODIUM 40 MILLIGRAM(S): 100 INJECTION SUBCUTANEOUS at 05:08

## 2018-11-21 NOTE — DISCHARGE NOTE ADULT - PATIENT PORTAL LINK FT
You can access the [facility] Patient Portal, offered by [facility], by registering with the following website: [email or web address]

## 2018-11-21 NOTE — PROGRESS NOTE ADULT - SUBJECTIVE AND OBJECTIVE BOX
Post Op Day#: 2  Procedure:  Laparoscopic Sleeve Gastrectomy    The patient is doing well without complaints.    Vital Signs Last 24 Hrs  T(C): 36.9 (21 Nov 2018 05:05), Max: 37.4 (20 Nov 2018 20:28)  T(F): 98.4 (21 Nov 2018 05:05), Max: 99.4 (20 Nov 2018 20:28)  HR: 79 (21 Nov 2018 05:05) (74 - 84)  BP: 100/58 (21 Nov 2018 05:05) (100/58 - 127/59)  BP(mean): --  RR: 18 (21 Nov 2018 05:05) (18 - 20)  SpO2: 98% (21 Nov 2018 05:05) (97% - 98%)    PHYSICAL EXAM:  General: NAD.  HEENT: no JVD, no jaundice.  LUNGS: CTAB.  Heart: S1 S2 RRR  Abd: soft nt/nd   Wounds: clean dry and intact                          11.2   10.07 )-----------( 226      ( 20 Nov 2018 08:02 )             34.4       11-20    139  |  104  |  7   ----------------------------<  96  3.8   |  28  |  0.66    Ca    8.3<L>      20 Nov 2018 08:02

## 2018-11-21 NOTE — DISCHARGE NOTE ADULT - MEDICATION SUMMARY - MEDICATIONS TO TAKE
I will START or STAY ON the medications listed below when I get home from the hospital:    Synthroid 175 mcg (0.175 mg) oral tablet  -- 1 tab(s) by mouth once a day  -- Indication: For MORBID OBESITY

## 2018-11-21 NOTE — PROGRESS NOTE ADULT - PROBLEM SELECTOR PLAN 1
The patient is s/p lap sleeve gastrectomy and doing very well.  All discharge instructions were given to the patient, as well as potential signs of complications.  The patient will follow up in 2 weeks.  [facility]

## 2018-11-21 NOTE — DISCHARGE NOTE ADULT - CARE PLAN
Principal Discharge DX:	Morbid obesity  Goal:	recover from surgery  Assessment and plan of treatment:	follow up in 2 weeks, follow the office packet

## 2018-11-27 DIAGNOSIS — K44.9 DIAPHRAGMATIC HERNIA WITHOUT OBSTRUCTION OR GANGRENE: ICD-10-CM

## 2018-11-27 DIAGNOSIS — E66.01 MORBID (SEVERE) OBESITY DUE TO EXCESS CALORIES: ICD-10-CM

## 2018-11-27 DIAGNOSIS — E03.9 HYPOTHYROIDISM, UNSPECIFIED: ICD-10-CM

## 2018-11-27 DIAGNOSIS — K21.9 GASTRO-ESOPHAGEAL REFLUX DISEASE WITHOUT ESOPHAGITIS: ICD-10-CM

## 2018-11-27 DIAGNOSIS — R01.1 CARDIAC MURMUR, UNSPECIFIED: ICD-10-CM

## 2018-12-04 LAB — SURGICAL PATHOLOGY FINAL REPORT - CH: SIGNIFICANT CHANGE UP

## 2021-05-18 NOTE — H&P PST ADULT - NSANTHGENDERRD_ENT_A_CORE
No

## 2021-06-25 ENCOUNTER — TRANSCRIPTION ENCOUNTER (OUTPATIENT)
Age: 46
End: 2021-06-25

## 2022-03-04 NOTE — ASU PATIENT PROFILE, ADULT - NSICDXPASTMEDICALHX_GEN_ALL_CORE_FT
PAST MEDICAL HISTORY:  Fibroids     GERD (gastroesophageal reflux disease)     Heart Murmur     Hypothyroidism     Morbid obesity

## 2022-03-04 NOTE — ASU PATIENT PROFILE, ADULT - VISION (WITH CORRECTIVE LENSES IF THE PATIENT USUALLY WEARS THEM):
HAD LASIK EYE SURGERY/Normal vision: sees adequately in most situations; can see medication labels, newsprint

## 2022-03-04 NOTE — ASU PATIENT PROFILE, ADULT - NSICDXPASTSURGICALHX_GEN_ALL_CORE_FT
PAST SURGICAL HISTORY:  Admission for adjustment of gastric lap band 2007 removal 2018     Section      H/O abdominoplasty     H/O bilateral breast reduction surgery with breast implants  silicone    H/O myomectomy 2001    H/O spinal fusion 10/2017    H/O: hysterectomy 2014 fibroids

## 2022-03-06 ENCOUNTER — TRANSCRIPTION ENCOUNTER (OUTPATIENT)
Age: 47
End: 2022-03-06

## 2022-03-07 ENCOUNTER — OUTPATIENT (OUTPATIENT)
Dept: OUTPATIENT SERVICES | Facility: HOSPITAL | Age: 47
LOS: 1 days | Discharge: HOME | End: 2022-03-07

## 2022-03-07 VITALS
OXYGEN SATURATION: 99 % | HEIGHT: 67 IN | RESPIRATION RATE: 16 BRPM | DIASTOLIC BLOOD PRESSURE: 72 MMHG | SYSTOLIC BLOOD PRESSURE: 108 MMHG | TEMPERATURE: 98 F | HEART RATE: 68 BPM | WEIGHT: 152.12 LBS

## 2022-03-07 VITALS
OXYGEN SATURATION: 98 % | DIASTOLIC BLOOD PRESSURE: 67 MMHG | RESPIRATION RATE: 16 BRPM | SYSTOLIC BLOOD PRESSURE: 122 MMHG | TEMPERATURE: 98 F | HEART RATE: 62 BPM

## 2022-03-07 DIAGNOSIS — Z90.710 ACQUIRED ABSENCE OF BOTH CERVIX AND UTERUS: Chronic | ICD-10-CM

## 2022-03-07 DIAGNOSIS — Z98.890 OTHER SPECIFIED POSTPROCEDURAL STATES: Chronic | ICD-10-CM

## 2022-03-07 DIAGNOSIS — Z98.1 ARTHRODESIS STATUS: Chronic | ICD-10-CM

## 2022-03-07 DIAGNOSIS — Z46.51 ENCOUNTER FOR FITTING AND ADJUSTMENT OF GASTRIC LAP BAND: Chronic | ICD-10-CM

## 2022-03-07 RX ORDER — APREPITANT 80 MG/1
40 CAPSULE ORAL ONCE
Refills: 0 | Status: COMPLETED | OUTPATIENT
Start: 2022-03-07 | End: 2022-03-07

## 2022-03-07 RX ORDER — HYDROMORPHONE HYDROCHLORIDE 2 MG/ML
0.5 INJECTION INTRAMUSCULAR; INTRAVENOUS; SUBCUTANEOUS
Refills: 0 | Status: DISCONTINUED | OUTPATIENT
Start: 2022-03-07 | End: 2022-03-07

## 2022-03-07 RX ORDER — FENTANYL CITRATE 50 UG/ML
25 INJECTION INTRAVENOUS
Refills: 0 | Status: DISCONTINUED | OUTPATIENT
Start: 2022-03-07 | End: 2022-03-07

## 2022-03-07 RX ORDER — ACETAMINOPHEN 500 MG
650 TABLET ORAL ONCE
Refills: 0 | Status: COMPLETED | OUTPATIENT
Start: 2022-03-07 | End: 2022-03-07

## 2022-03-07 RX ORDER — OXYCODONE HYDROCHLORIDE 5 MG/1
5 TABLET ORAL ONCE
Refills: 0 | Status: DISCONTINUED | OUTPATIENT
Start: 2022-03-07 | End: 2022-03-07

## 2022-03-07 RX ORDER — SODIUM CHLORIDE 9 MG/ML
500 INJECTION, SOLUTION INTRAVENOUS
Refills: 0 | Status: DISCONTINUED | OUTPATIENT
Start: 2022-03-07 | End: 2022-03-07

## 2022-03-07 RX ADMIN — Medication 650 MILLIGRAM(S): at 07:42

## 2022-03-07 RX ADMIN — FENTANYL CITRATE 25 MICROGRAM(S): 50 INJECTION INTRAVENOUS at 13:25

## 2022-03-07 RX ADMIN — Medication 650 MILLIGRAM(S): at 12:45

## 2022-03-07 RX ADMIN — OXYCODONE HYDROCHLORIDE 5 MILLIGRAM(S): 5 TABLET ORAL at 14:00

## 2022-03-07 RX ADMIN — APREPITANT 40 MILLIGRAM(S): 80 CAPSULE ORAL at 07:58

## 2022-03-07 RX ADMIN — FENTANYL CITRATE 25 MICROGRAM(S): 50 INJECTION INTRAVENOUS at 13:40

## 2022-03-07 RX ADMIN — OXYCODONE HYDROCHLORIDE 5 MILLIGRAM(S): 5 TABLET ORAL at 13:36

## 2022-03-07 NOTE — ASU DISCHARGE PLAN (ADULT/PEDIATRIC) - CARE PROVIDER_API CALL
Jim Osorio)  Plastic Surgery  [address] [place] [address]  Phone: [phone]  Fax: [phone]  Follow Up Time:

## 2022-03-07 NOTE — ASU DISCHARGE PLAN (ADULT/PEDIATRIC) - ASU DC SPECIAL INSTRUCTIONSFT
Please follow up with Dr. Osorio within 1 week of your surgery date. You may call his office to schedule an appointment or with any questions/concens.     Please follow the post operative instructions given to you by Dr. Osorio's office for post operative care.     Please take the medication sent to your pharmacy by Dr. Oosrio's office as directed. Please follow up with Dr. Osorio within 1 week of your surgery date. You may call his office to schedule an appointment or with any questions/concens.     Please follow the post operative instructions given to you by Dr. Osorio's office for post operative care.     Please take the medication sent to your pharmacy by Dr. Osorio's office as directed. Please keep your dressing on, clean and dry. This will be removed at your follow up appointment.     You will be discharged with TACOS drains. You will need to empty them and record outputs accurately. This will be taught to you by the nursing staff. Please do not remove the TACOS drains. They will be removed in the office. Please bring to the office accurate records of output.

## 2022-03-07 NOTE — ASU DISCHARGE PLAN (ADULT/PEDIATRIC) - NS MD DC FALL RISK RISK
For information on Fall & Injury Prevention, visit: [email or web address] OR  [email or web address] OR  https://www.cdc.gov/steadi/patient.html

## 2022-03-07 NOTE — ASU DISCHARGE PLAN (ADULT/PEDIATRIC) - 
ADDITIONAL INFORMATION
Sedation provided per anesthesia. See anesthesia record for medication administration and vitals.
covid infection 12/1021, pcr  to be done 3/5/2022 at a [facility]  Facility

## 2022-04-18 ENCOUNTER — INPATIENT (INPATIENT)
Facility: HOSPITAL | Age: 47
LOS: 1 days | Discharge: HOME | DRG: 389 | End: 2022-04-20
Attending: STUDENT IN AN ORGANIZED HEALTH CARE EDUCATION/TRAINING PROGRAM | Admitting: STUDENT IN AN ORGANIZED HEALTH CARE EDUCATION/TRAINING PROGRAM
Payer: MEDICARE

## 2022-04-18 VITALS
RESPIRATION RATE: 16 BRPM | OXYGEN SATURATION: 97 % | HEART RATE: 78 BPM | HEIGHT: 67 IN | TEMPERATURE: 97 F | WEIGHT: 145.06 LBS | DIASTOLIC BLOOD PRESSURE: 73 MMHG | SYSTOLIC BLOOD PRESSURE: 104 MMHG

## 2022-04-18 DIAGNOSIS — Z98.890 OTHER SPECIFIED POSTPROCEDURAL STATES: Chronic | ICD-10-CM

## 2022-04-18 DIAGNOSIS — Z90.710 ACQUIRED ABSENCE OF BOTH CERVIX AND UTERUS: Chronic | ICD-10-CM

## 2022-04-18 DIAGNOSIS — Z98.1 ARTHRODESIS STATUS: Chronic | ICD-10-CM

## 2022-04-18 DIAGNOSIS — K56.609 UNSPECIFIED INTESTINAL OBSTRUCTION, UNSPECIFIED AS TO PARTIAL VERSUS COMPLETE OBSTRUCTION: ICD-10-CM

## 2022-04-18 DIAGNOSIS — Z46.51 ENCOUNTER FOR FITTING AND ADJUSTMENT OF GASTRIC LAP BAND: Chronic | ICD-10-CM

## 2022-04-18 LAB
ALBUMIN SERPL ELPH-MCNC: 3.9 G/DL — SIGNIFICANT CHANGE UP (ref 3.3–5)
ALP SERPL-CCNC: 70 U/L — SIGNIFICANT CHANGE UP (ref 30–120)
ALT FLD-CCNC: 25 U/L DA — SIGNIFICANT CHANGE UP (ref 10–60)
ANION GAP SERPL CALC-SCNC: 9 MMOL/L — SIGNIFICANT CHANGE UP (ref 5–17)
APPEARANCE UR: CLEAR — SIGNIFICANT CHANGE UP
AST SERPL-CCNC: 15 U/L — SIGNIFICANT CHANGE UP (ref 10–40)
BASOPHILS # BLD AUTO: 0.07 K/UL — SIGNIFICANT CHANGE UP (ref 0–0.2)
BASOPHILS NFR BLD AUTO: 0.4 % — SIGNIFICANT CHANGE UP (ref 0–2)
BILIRUB SERPL-MCNC: 0.6 MG/DL — SIGNIFICANT CHANGE UP (ref 0.2–1.2)
BILIRUB UR-MCNC: NEGATIVE — SIGNIFICANT CHANGE UP
BUN SERPL-MCNC: 14 MG/DL — SIGNIFICANT CHANGE UP (ref 7–23)
CALCIUM SERPL-MCNC: 9.6 MG/DL — SIGNIFICANT CHANGE UP (ref 8.4–10.5)
CHLORIDE SERPL-SCNC: 99 MMOL/L — SIGNIFICANT CHANGE UP (ref 96–108)
CO2 SERPL-SCNC: 28 MMOL/L — SIGNIFICANT CHANGE UP (ref 22–31)
COLOR SPEC: YELLOW — SIGNIFICANT CHANGE UP
CREAT SERPL-MCNC: 0.81 MG/DL — SIGNIFICANT CHANGE UP (ref 0.5–1.3)
DIFF PNL FLD: ABNORMAL
EGFR: 91 ML/MIN/1.73M2 — SIGNIFICANT CHANGE UP
EOSINOPHIL # BLD AUTO: 0.12 K/UL — SIGNIFICANT CHANGE UP (ref 0–0.5)
EOSINOPHIL NFR BLD AUTO: 0.8 % — SIGNIFICANT CHANGE UP (ref 0–6)
GLUCOSE SERPL-MCNC: 118 MG/DL — HIGH (ref 70–99)
GLUCOSE UR QL: NEGATIVE MG/DL — SIGNIFICANT CHANGE UP
HCG SERPL-ACNC: <1 MIU/ML — SIGNIFICANT CHANGE UP
HCG UR QL: NEGATIVE — SIGNIFICANT CHANGE UP
HCT VFR BLD CALC: 45.6 % — HIGH (ref 34.5–45)
HGB BLD-MCNC: 15.3 G/DL — SIGNIFICANT CHANGE UP (ref 11.5–15.5)
IMM GRANULOCYTES NFR BLD AUTO: 0.5 % — SIGNIFICANT CHANGE UP (ref 0–1.5)
KETONES UR-MCNC: ABNORMAL
LEUKOCYTE ESTERASE UR-ACNC: NEGATIVE — SIGNIFICANT CHANGE UP
LIDOCAIN IGE QN: 62 U/L — LOW (ref 73–393)
LYMPHOCYTES # BLD AUTO: 15.7 % — SIGNIFICANT CHANGE UP (ref 13–44)
LYMPHOCYTES # BLD AUTO: 2.49 K/UL — SIGNIFICANT CHANGE UP (ref 1–3.3)
MCHC RBC-ENTMCNC: 29 PG — SIGNIFICANT CHANGE UP (ref 27–34)
MCHC RBC-ENTMCNC: 33.6 GM/DL — SIGNIFICANT CHANGE UP (ref 32–36)
MCV RBC AUTO: 86.5 FL — SIGNIFICANT CHANGE UP (ref 80–100)
MONOCYTES # BLD AUTO: 0.85 K/UL — SIGNIFICANT CHANGE UP (ref 0–0.9)
MONOCYTES NFR BLD AUTO: 5.3 % — SIGNIFICANT CHANGE UP (ref 2–14)
NEUTROPHILS # BLD AUTO: 12.3 K/UL — HIGH (ref 1.8–7.4)
NEUTROPHILS NFR BLD AUTO: 77.3 % — HIGH (ref 43–77)
NITRITE UR-MCNC: NEGATIVE — SIGNIFICANT CHANGE UP
NRBC # BLD: 0 /100 WBCS — SIGNIFICANT CHANGE UP (ref 0–0)
PH UR: 6.5 — SIGNIFICANT CHANGE UP (ref 5–8)
PLATELET # BLD AUTO: 362 K/UL — SIGNIFICANT CHANGE UP (ref 150–400)
POTASSIUM SERPL-MCNC: 3.4 MMOL/L — LOW (ref 3.5–5.3)
POTASSIUM SERPL-SCNC: 3.4 MMOL/L — LOW (ref 3.5–5.3)
PROT SERPL-MCNC: 7.6 G/DL — SIGNIFICANT CHANGE UP (ref 6–8.3)
PROT UR-MCNC: 15 MG/DL
RBC # BLD: 5.27 M/UL — HIGH (ref 3.8–5.2)
RBC # FLD: 12 % — SIGNIFICANT CHANGE UP (ref 10.3–14.5)
SARS-COV-2 RNA SPEC QL NAA+PROBE: SIGNIFICANT CHANGE UP
SODIUM SERPL-SCNC: 136 MMOL/L — SIGNIFICANT CHANGE UP (ref 135–145)
SP GR SPEC: 1.01 — SIGNIFICANT CHANGE UP (ref 1.01–1.02)
UROBILINOGEN FLD QL: NEGATIVE MG/DL — SIGNIFICANT CHANGE UP
WBC # BLD: 15.91 K/UL — HIGH (ref 3.8–10.5)
WBC # FLD AUTO: 15.91 K/UL — HIGH (ref 3.8–10.5)

## 2022-04-18 PROCEDURE — 93010 ELECTROCARDIOGRAM REPORT: CPT

## 2022-04-18 PROCEDURE — 74177 CT ABD & PELVIS W/CONTRAST: CPT | Mod: 26,MA

## 2022-04-18 PROCEDURE — 71045 X-RAY EXAM CHEST 1 VIEW: CPT | Mod: 26

## 2022-04-18 PROCEDURE — 99285 EMERGENCY DEPT VISIT HI MDM: CPT

## 2022-04-18 RX ORDER — MORPHINE SULFATE 50 MG/1
4 CAPSULE, EXTENDED RELEASE ORAL ONCE
Refills: 0 | Status: DISCONTINUED | OUTPATIENT
Start: 2022-04-18 | End: 2022-04-18

## 2022-04-18 RX ORDER — ONDANSETRON 8 MG/1
4 TABLET, FILM COATED ORAL ONCE
Refills: 0 | Status: COMPLETED | OUTPATIENT
Start: 2022-04-18 | End: 2022-04-18

## 2022-04-18 RX ORDER — ACETAMINOPHEN 500 MG
1000 TABLET ORAL EVERY 6 HOURS
Refills: 0 | Status: COMPLETED | OUTPATIENT
Start: 2022-04-18 | End: 2022-04-19

## 2022-04-18 RX ORDER — SODIUM CHLORIDE 9 MG/ML
1000 INJECTION INTRAMUSCULAR; INTRAVENOUS; SUBCUTANEOUS ONCE
Refills: 0 | Status: COMPLETED | OUTPATIENT
Start: 2022-04-18 | End: 2022-04-18

## 2022-04-18 RX ORDER — ONDANSETRON 8 MG/1
4 TABLET, FILM COATED ORAL EVERY 6 HOURS
Refills: 0 | Status: DISCONTINUED | OUTPATIENT
Start: 2022-04-18 | End: 2022-04-20

## 2022-04-18 RX ADMIN — MORPHINE SULFATE 4 MILLIGRAM(S): 50 CAPSULE, EXTENDED RELEASE ORAL at 19:30

## 2022-04-18 RX ADMIN — SODIUM CHLORIDE 1000 MILLILITER(S): 9 INJECTION INTRAMUSCULAR; INTRAVENOUS; SUBCUTANEOUS at 20:07

## 2022-04-18 RX ADMIN — MORPHINE SULFATE 4 MILLIGRAM(S): 50 CAPSULE, EXTENDED RELEASE ORAL at 19:07

## 2022-04-18 RX ADMIN — SODIUM CHLORIDE 1000 MILLILITER(S): 9 INJECTION INTRAMUSCULAR; INTRAVENOUS; SUBCUTANEOUS at 19:06

## 2022-04-18 RX ADMIN — ONDANSETRON 4 MILLIGRAM(S): 8 TABLET, FILM COATED ORAL at 19:07

## 2022-04-18 NOTE — ED PROVIDER NOTE - CLINICAL SUMMARY MEDICAL DECISION MAKING FREE TEXT BOX
47 yo female hx of gastric sleeve 2018, partial hysterectomy, abdominoplasty, adhd, hypothyroidism, here with abdominal pain x 1 day, right lower abd pain after dinner last night, worsening, nonradiating, +vomiting x 3 episodes.  pt well appearing, +mild ttp rlq , cta b/l, rrr, CT showing mid SBO.  NGT, surgery consult, admit

## 2022-04-18 NOTE — ED PROVIDER NOTE - OBJECTIVE STATEMENT
45 y/o F with hx of gastric sleeve in 2018, partial hysterectomy, abdominoplasty, ADHD, hypothyroidism presents with c/o abdominal pain since yesterday. Pt states that she started having R lower abdominal pain after dinner last night but got worse about 3 hours PTA, pain is sharp/crampy, constant. States that she had 3 episodes of vomiting today. States that her abdomen feels bloated. Denies fever, diarrhea, constipation, urinary symptoms, CP, SOB, recent travel, sick contacts.

## 2022-04-18 NOTE — ED ADULT TRIAGE NOTE - HEIGHT IN INCHES
PROBLEM LIST  LABS: Apos/RI    1. AMA: plans level II US.  2. History gestational diabetes 1st preg: plan early glucose tolerance test at 18-20 weeks.    Early GCT was high, 3 hr was with one high value. Discussed COVID vaccine, and she is done with one shot, plans the second at 28 days.  Due to measuring ahead of dates and one high value on 3 hr glucose tolerance test, plan growth US at 34 weeks.    Sharmila Ramirez MD    
7

## 2022-04-18 NOTE — ED PROVIDER NOTE - NS ED ATTENDING STATEMENT MOD
This was a shared visit with the OREN. I reviewed and verified the documentation and independently performed the documented:

## 2022-04-18 NOTE — PATIENT PROFILE ADULT - FALL HARM RISK - UNIVERSAL INTERVENTIONS
Bed in lowest position, wheels locked, appropriate side rails in place/Call bell, personal items and telephone in reach/Instruct patient to call for assistance before getting out of bed or chair/Non-slip footwear when patient is out of bed/Roberts to call system/Physically safe environment - no spills, clutter or unnecessary equipment/Purposeful Proactive Rounding/Room/bathroom lighting operational, light cord in reach

## 2022-04-18 NOTE — ED PROVIDER NOTE - PROGRESS NOTE DETAILS
Spoke with surgery, Dr. Stewart, discussed case and results, advised NGT, NPO, IVF, admit to medicine. Spoke with surgery, Dr. Stewart, discussed case and results, advised NGT, NPO, IVF, admit. Spoke with Dr. Whitfield who will do medicine consult. pt has hx of deviated nasal septum, unable to pass size 18 NGT by RN, tried size 14 but caused nose bleed. Pt refused NGT at this time. Pt comfortable at this time, denies any pain, no vomiting in ED. As per surgery, Dr. Stewart, can hold off NGT at this time, advised to call if pt vomits and will have to try ngt again. Pt can get IVF and stay NPO. accepted admission to her service. Advised to get cbc/cmp/lactate at 6am tomorrow. this pt was seen by Dr. Ross

## 2022-04-18 NOTE — ED ADULT NURSE NOTE - OBJECTIVE STATEMENT
"I have sharp intermittent pain on my rt side since this morning with vomiting"  Denies fever or diarrhea

## 2022-04-19 DIAGNOSIS — E03.9 HYPOTHYROIDISM, UNSPECIFIED: ICD-10-CM

## 2022-04-19 DIAGNOSIS — Z29.9 ENCOUNTER FOR PROPHYLACTIC MEASURES, UNSPECIFIED: ICD-10-CM

## 2022-04-19 DIAGNOSIS — K56.609 UNSPECIFIED INTESTINAL OBSTRUCTION, UNSPECIFIED AS TO PARTIAL VERSUS COMPLETE OBSTRUCTION: ICD-10-CM

## 2022-04-19 DIAGNOSIS — Z98.84 BARIATRIC SURGERY STATUS: Chronic | ICD-10-CM

## 2022-04-19 DIAGNOSIS — K21.9 GASTRO-ESOPHAGEAL REFLUX DISEASE WITHOUT ESOPHAGITIS: ICD-10-CM

## 2022-04-19 LAB
ALBUMIN SERPL ELPH-MCNC: 3 G/DL — LOW (ref 3.3–5)
ALP SERPL-CCNC: 48 U/L — SIGNIFICANT CHANGE UP (ref 30–120)
ALT FLD-CCNC: 18 U/L DA — SIGNIFICANT CHANGE UP (ref 10–60)
ANION GAP SERPL CALC-SCNC: 6 MMOL/L — SIGNIFICANT CHANGE UP (ref 5–17)
AST SERPL-CCNC: 13 U/L — SIGNIFICANT CHANGE UP (ref 10–40)
BASOPHILS # BLD AUTO: 0.06 K/UL — SIGNIFICANT CHANGE UP (ref 0–0.2)
BASOPHILS NFR BLD AUTO: 0.5 % — SIGNIFICANT CHANGE UP (ref 0–2)
BILIRUB SERPL-MCNC: 0.6 MG/DL — SIGNIFICANT CHANGE UP (ref 0.2–1.2)
BUN SERPL-MCNC: 15 MG/DL — SIGNIFICANT CHANGE UP (ref 7–23)
CALCIUM SERPL-MCNC: 8.4 MG/DL — SIGNIFICANT CHANGE UP (ref 8.4–10.5)
CHLORIDE SERPL-SCNC: 107 MMOL/L — SIGNIFICANT CHANGE UP (ref 96–108)
CO2 SERPL-SCNC: 24 MMOL/L — SIGNIFICANT CHANGE UP (ref 22–31)
CREAT SERPL-MCNC: 0.67 MG/DL — SIGNIFICANT CHANGE UP (ref 0.5–1.3)
EGFR: 109 ML/MIN/1.73M2 — SIGNIFICANT CHANGE UP
EOSINOPHIL # BLD AUTO: 0.18 K/UL — SIGNIFICANT CHANGE UP (ref 0–0.5)
EOSINOPHIL NFR BLD AUTO: 1.6 % — SIGNIFICANT CHANGE UP (ref 0–6)
GLUCOSE SERPL-MCNC: 82 MG/DL — SIGNIFICANT CHANGE UP (ref 70–99)
HCT VFR BLD CALC: 36.2 % — SIGNIFICANT CHANGE UP (ref 34.5–45)
HGB BLD-MCNC: 12.2 G/DL — SIGNIFICANT CHANGE UP (ref 11.5–15.5)
IMM GRANULOCYTES NFR BLD AUTO: 0.4 % — SIGNIFICANT CHANGE UP (ref 0–1.5)
LACTATE SERPL-SCNC: 0.2 MMOL/L — LOW (ref 0.7–2)
LYMPHOCYTES # BLD AUTO: 18.7 % — SIGNIFICANT CHANGE UP (ref 13–44)
LYMPHOCYTES # BLD AUTO: 2.13 K/UL — SIGNIFICANT CHANGE UP (ref 1–3.3)
MCHC RBC-ENTMCNC: 29.3 PG — SIGNIFICANT CHANGE UP (ref 27–34)
MCHC RBC-ENTMCNC: 33.7 GM/DL — SIGNIFICANT CHANGE UP (ref 32–36)
MCV RBC AUTO: 87 FL — SIGNIFICANT CHANGE UP (ref 80–100)
MONOCYTES # BLD AUTO: 0.61 K/UL — SIGNIFICANT CHANGE UP (ref 0–0.9)
MONOCYTES NFR BLD AUTO: 5.4 % — SIGNIFICANT CHANGE UP (ref 2–14)
NEUTROPHILS # BLD AUTO: 8.36 K/UL — HIGH (ref 1.8–7.4)
NEUTROPHILS NFR BLD AUTO: 73.4 % — SIGNIFICANT CHANGE UP (ref 43–77)
NRBC # BLD: 0 /100 WBCS — SIGNIFICANT CHANGE UP (ref 0–0)
PLATELET # BLD AUTO: 245 K/UL — SIGNIFICANT CHANGE UP (ref 150–400)
POTASSIUM SERPL-MCNC: 3.5 MMOL/L — SIGNIFICANT CHANGE UP (ref 3.5–5.3)
POTASSIUM SERPL-SCNC: 3.5 MMOL/L — SIGNIFICANT CHANGE UP (ref 3.5–5.3)
PROT SERPL-MCNC: 5.7 G/DL — LOW (ref 6–8.3)
RBC # BLD: 4.16 M/UL — SIGNIFICANT CHANGE UP (ref 3.8–5.2)
RBC # FLD: 12.1 % — SIGNIFICANT CHANGE UP (ref 10.3–14.5)
SODIUM SERPL-SCNC: 137 MMOL/L — SIGNIFICANT CHANGE UP (ref 135–145)
WBC # BLD: 11.38 K/UL — HIGH (ref 3.8–10.5)
WBC # FLD AUTO: 11.38 K/UL — HIGH (ref 3.8–10.5)

## 2022-04-19 PROCEDURE — 99222 1ST HOSP IP/OBS MODERATE 55: CPT

## 2022-04-19 RX ORDER — ACETAMINOPHEN 500 MG
1000 TABLET ORAL EVERY 6 HOURS
Refills: 0 | Status: DISCONTINUED | OUTPATIENT
Start: 2022-04-19 | End: 2022-04-20

## 2022-04-19 RX ORDER — SODIUM CHLORIDE 9 MG/ML
1000 INJECTION, SOLUTION INTRAVENOUS
Refills: 0 | Status: DISCONTINUED | OUTPATIENT
Start: 2022-04-19 | End: 2022-04-20

## 2022-04-19 RX ORDER — HEPARIN SODIUM 5000 [USP'U]/ML
5000 INJECTION INTRAVENOUS; SUBCUTANEOUS EVERY 12 HOURS
Refills: 0 | Status: DISCONTINUED | OUTPATIENT
Start: 2022-04-19 | End: 2022-04-20

## 2022-04-19 RX ORDER — LEVOTHYROXINE SODIUM 125 MCG
75 TABLET ORAL AT BEDTIME
Refills: 0 | Status: DISCONTINUED | OUTPATIENT
Start: 2022-04-19 | End: 2022-04-20

## 2022-04-19 RX ORDER — IBUPROFEN 200 MG
800 TABLET ORAL EVERY 8 HOURS
Refills: 0 | Status: DISCONTINUED | OUTPATIENT
Start: 2022-04-19 | End: 2022-04-20

## 2022-04-19 RX ORDER — PANTOPRAZOLE SODIUM 20 MG/1
40 TABLET, DELAYED RELEASE ORAL DAILY
Refills: 0 | Status: DISCONTINUED | OUTPATIENT
Start: 2022-04-19 | End: 2022-04-20

## 2022-04-19 RX ADMIN — Medication 75 MICROGRAM(S): at 21:42

## 2022-04-19 RX ADMIN — Medication 400 MILLIGRAM(S): at 02:48

## 2022-04-19 RX ADMIN — HEPARIN SODIUM 5000 UNIT(S): 5000 INJECTION INTRAVENOUS; SUBCUTANEOUS at 17:31

## 2022-04-19 RX ADMIN — SODIUM CHLORIDE 50 MILLILITER(S): 9 INJECTION, SOLUTION INTRAVENOUS at 21:43

## 2022-04-19 RX ADMIN — SODIUM CHLORIDE 100 MILLILITER(S): 9 INJECTION, SOLUTION INTRAVENOUS at 10:00

## 2022-04-19 RX ADMIN — SODIUM CHLORIDE 50 MILLILITER(S): 9 INJECTION, SOLUTION INTRAVENOUS at 17:33

## 2022-04-19 RX ADMIN — Medication 1000 MILLIGRAM(S): at 03:18

## 2022-04-19 RX ADMIN — PANTOPRAZOLE SODIUM 40 MILLIGRAM(S): 20 TABLET, DELAYED RELEASE ORAL at 12:35

## 2022-04-19 NOTE — CONSULT NOTE ADULT - PROBLEM SELECTOR RECOMMENDATION 9
Patient is clinically improving.   Passing gas.   No BM yet.   Started on clear liquid diet by surgery.   Advance as per surgery.   Further management as per patient's clinical course.

## 2022-04-19 NOTE — H&P ADULT - NSICDXFAMILYHX_GEN_ALL_CORE_FT
FAMILY HISTORY:  Mother  Still living? Unknown  Family history of uterine fibroid, Age at diagnosis: Age Unknown  FH: diabetes mellitus, Age at diagnosis: Age Unknown  FH: hyperlipidemia, Age at diagnosis: Age Unknown  FH: hypertension, Age at diagnosis: Age Unknown  FH: manic depression, Age at diagnosis: Age Unknown    Grandparent  Still living? Unknown  FH: pancreatic cancer, Age at diagnosis: Age Unknown

## 2022-04-19 NOTE — H&P ADULT - NS ATTEND AMEND GEN_ALL_CORE FT
I have personally seen and examined the patient.  I fully participated in the care of this patient.  I have made amendments to the documentation where necessary, and agree with the history, physical exam, impression/assessment, and plan as documented by the PA    At the time of my examination, patient has now begun passing flatus multiple times since this AM. Denies nausea/vomiting since presentation to ED. Patient reports abdominal pain has greatly improved. She is ambulating in hallways.     Abdomen is softly distended, mildly tender in lower abdomen.    Advance to bariatric clear liquid. Patient educated to hold off if she feels nauseated or has worsening abdominal pain .

## 2022-04-19 NOTE — H&P ADULT - ASSESSMENT
46 year old female admitted with CT scan evidence of "Mid to distal small bowel obstruction. Ascites."  Patient was unable to tolerate NG tube placement in ER    Admit to Dr. Stewart  NPO  Maintain without NG tube, may need to reattempt placement if patient develops N/V, abdominal pain  IV fluids  Zofran  Pain control  Protonix  DVT prophylaxis  Serial abdominal exams  Await return of GI function  Case and plan D/W Dr. Stewart    Of note patient also noted to have   "3.2 cm hemorrhagic left ovarian cyst. Recommend follow-up ultrasound to   assure resolution. Partial hysterectomy.", will need to follow up outpatient.

## 2022-04-19 NOTE — DIETITIAN INITIAL EVALUATION ADULT - NSICDXPASTSURGICALHX_GEN_ALL_CORE_FT
Panel Management Review      Patient has the following on her problem list:     Depression / Dysthymia review    Measure:  Needs PHQ-9 score of 4 or less during index window.  Administer PHQ-9 and if score is 5 or more, send encounter to provider for next steps.    5 - 7 month window range: 1/19/2018-5/19/2019    PHQ-9 SCORE 10/8/2018 11/19/2018 12/6/2018   PHQ-9 Total Score - - -   PHQ-9 Total Score MyChart 6 (Mild depression) Incomplete -   PHQ-9 Total Score 6 - 1       If PHQ-9 recheck is 5 or more, route to provider for next steps.    Patient is due for:  PHQ9, phsyical, mammo      Composite cancer screening  Chart review shows that this patient is due/due soon for the following Pap Smear, Mammogram and Colonoscopy  Summary:    Patient is due/failing the following:   COLONOSCOPY, MAMMOGRAM, PAP, PHQ9 and PHYSICAL    Action needed:   Patient needs office visit for Physical., Patient needs to do PHQ9. and Patient needs referral/order: Colonoscopy     PAST SURGICAL HISTORY:  Admission for adjustment of gastric lap band 2007 removal 2018     Section      H/O abdominoplasty     H/O bilateral breast reduction surgery with breast implants  silicone    H/O myomectomy 2001    H/O spinal fusion 10/2017    H/O: hysterectomy 2014 fibroids    S/P laparoscopic sleeve gastrectomy 2018

## 2022-04-19 NOTE — H&P ADULT - NSICDXPASTMEDICALHX_GEN_ALL_CORE_FT
PAST MEDICAL HISTORY:  Fibroids     GERD (gastroesophageal reflux disease)     Heart Murmur Patient state she was told she no longer has this    Hypothyroidism     Morbid obesity resolved with weight loss surgery

## 2022-04-19 NOTE — CONSULT NOTE ADULT - SUBJECTIVE AND OBJECTIVE BOX
Patient is a 46y old  Female who presents with a chief complaint of abdominal pain.      (2022 10:17)      HPI:  46 year old female with history of gastric band 2007 with subsequent removal and conversion to sleeve gastrectomy in  presented to [place] ER with complaint of Right lower abdominal pain beginning 2022 which she described as sharp, crampy and constant which was associated with 3 episodes of vomiting on 2022.  Now patient states her pain has improved, is intermittent and well controlled with pain meds.  Denies N/V at this time.  Denies flatus and BM, states she had a sip of water today and did not develop N/V following.    Of note this is her first episode of SBO. Patient is admitted to bariatric surgery service. She is being seen for medical comanagement.     She c/o some abdominal discomfort.   She denies any chest pain or pressure.   No vomiting today.     PAST MEDICAL & SURGICAL HISTORY:  Hypothyroidism    Heart Murmur  Patient state she was told she no longer has this    Fibroids    Morbid obesity  resolved with weight loss surgery    GERD (gastroesophageal reflux disease)     Section     H/O spinal fusion  10/2017    H/O bilateral breast reduction surgery  with breast implants  silicone    Admission for adjustment of gastric lap band  2007 removal 2018    H/O myomectomy  2001    H/O: hysterectomy  2014 fibroids    H/O abdominoplasty      S/P laparoscopic sleeve gastrectomy  2018        Allergies    No Known Allergies    Intolerances        Home Medications:  One A Day Women&#x27;s Complete: 1 tab(s) chewed once a day (2022 09:22)  Synthroid 150 mcg (0.15 mg) oral tablet: 1 tab(s) orally once a day (2022 09:22)      MEDICATIONS  (STANDING):  heparin   Injectable 5000 Unit(s) SubCutaneous every 12 hours  lactated ringers. 1000 milliLiter(s) (50 mL/Hr) IV Continuous <Continuous>  levothyroxine Injectable 75 MICROGram(s) IV Push at bedtime  pantoprazole  Injectable 40 milliGRAM(s) IV Push daily    MEDICATIONS  (PRN):  acetaminophen   IVPB .. 1000 milliGRAM(s) IV Intermittent every 6 hours PRN Mild Pain (1 - 3)  ibuprofen IVPB .. 800 milliGRAM(s) IV Intermittent every 8 hours PRN Moderate Pain (4 - 6)  ondansetron Injectable 4 milliGRAM(s) IV Push every 6 hours PRN Nausea and/or Vomiting      FAMILY HISTORY:  FH: diabetes mellitus (Mother)    FH: hypertension (Mother)    FH: hyperlipidemia (Mother)    Family history of uterine fibroid (Mother)    FH: manic depression (Mother)    FH: pancreatic cancer (Grandparent)        Social History: Denies any smoking or alcohol abuse.     REVIEW OF SYSTEMS:  CONSTITUTIONAL: No fever or chills.   EYES: No eye pain or discharge  ENMT: No sinus or throat pain  NECK: No pain or stiffness  BREASTS: No pain, masses, or nipple discharge  RESPIRATORY: No cough or shortness of breath  CARDIOVASCULAR: No chest pain, palpitations, dizziness.   GASTROINTESTINAL: + abdominal pain (improved)  GENITOURINARY: No dysuria, hematuria, or incontinence  NEUROLOGICAL: No headaches,  numbness, or tremors  SKIN: No itching, burning, rashes, or lesions   LYMPH NODES: No enlarged glands  ENDOCRINE: No polydipsia or polyuria  MUSCULOSKELETAL: No joint pain or swelling;  PSYCHIATRIC: No difficulty sleeping  HEME/LYMPH: No easy bruising, or bleeding gums  ALLERGY AND IMMUNOLOGIC: No hives or eczema    Vital Signs Last 24 Hrs  T(C): 36.6 (2022 14:20), Max: 37.2 (2022 23:54)  T(F): 97.9 (2022 14:20), Max: 98.9 (2022 23:54)  HR: 74 (2022 14:20) (66 - 85)  BP: 108/68 (2022 14:20) (99/61 - 121/66)  BP(mean): --  RR: 20 (2022 14:20) (16 - 20)  SpO2: 98% (2022 14:20) (94% - 98%)    PHYSICAL EXAM:  GENERAL: NAD, well-groomed, well-developed  HEAD:  Atraumatic, Normocephalic  EYES:  Mild Pallor +  ENMT: Moist mucous membranes, no lesions  NECK: Supple, No JVD, Normal thyroid  CHEST/LUNG: Decreased breath sounds at bases, rest is clear.   HEART: Regular rate and rhythm; No murmurs, rubs, or gallops  ABDOMEN: Soft, Nontender, Nondistended; Bowel sounds present  EXTREMITIES:  Pulses +  LYMPH: No lymphadenopathy noted  SKIN: No rashes or lesions  NERVOUS SYSTEM:  No focal deficit.   PSYCH:  Awake and alert.    LABS:                        12.2   11.38 )-----------( 245      ( 2022 07:39 )             36.2     2022 07:39    137    |  107    |  15     ----------------------------<  82     3.5     |  24     |  0.67     Ca    8.4        2022 07:39    TPro  5.7    /  Alb  3.0    /  TBili  0.6    /  DBili  x      /  AST  13     /  ALT  18     /  AlkPhos  48     2022 07:39      Urinalysis Basic - ( 2022 18:51 )    Color: Yellow / Appearance: Clear / S.015 / pH: x  Gluc: x / Ketone: Moderate  / Bili: Negative / Urobili: Negative mg/dL   Blood: x / Protein: 15 mg/dL / Nitrite: Negative   Leuk Esterase: Negative / RBC: 0-2 /HPF / WBC Negative   Sq Epi: x / Non Sq Epi: Few / Bacteria: Occasional    RADIOLOGY & ADDITIONAL TESTS:    < from: Xray Chest 1 View AP/PA (22 @ 22:15) >  ACC: [id number] EXAM:  XR CHEST AP OR PA 1V                          PROCEDURE DATE:  2022          INTERPRETATION:  Frontal chest on 2022 at 10:16 PM. Patient has   abdominal pain and is scheduled for admission.    Heart size is within normal limits.    Lungs are clear.    No free intraperitoneal air.    Above findings are similar to 2018.    Present film shows dilated small bowel with air-fluid levels.    IMPRESSION: Small bowel findings as above. Chest unremarkable    < end of copied text >    < from: CT Abdomen and Pelvis w/ IV Cont (22 @ 20:36) >    PROCEDURE DATE:  2022          INTERPRETATION:  CLINICAL INFORMATION: 46 years  Female with right side   abdominal pain.    COMPARISON: None.    CONTRAST/COMPLICATIONS:  IV Contrast: Omnipaque 350  90 cc administered   10 cc discarded  Oral Contrast: NONE  Complications: None reported at time of study completion    PROCEDURE:  CT of the Abdomen and Pelvis was performed.  Sagittal and coronal reformats were performed.    FINDINGS:  LOWER CHEST: Within normal limits. Partially visualized bilateral breast   implants.    LIVER: Within normal limits.  BILE DUCTS: Normal caliber.  GALLBLADDER: Within normal limits.  SPLEEN: Within normal limits.  PANCREAS: Within normal limits.  ADRENALS: Within normal limits.  KIDNEYS/URETERS: Within normal limits.    BLADDER: Within normal limits.  REPRODUCTIVE ORGANS: Supracervical hysterectomy. 3.2 cm hemorrhagic left   ovarian cyst.    BOWEL: Abnormally distended fluid-filled small bowel loops measuring up   to 3.9 cm in diameter with transition point in the mid to lower abdomen   (4:63 and 5:64). The distal small bowel loops are collapsed. Status post   gastric bypass surgery. Appendix is not visualized. No evidence of   inflammation in the pericecal region.  PERITONEUM: Small volume ascites.  VESSELS: Within normal limits.  RETROPERITONEUM/LYMPH NODES: No lymphadenopathy.  ABDOMINAL WALL: Postoperative changes.  BONES: Mild degenerative changes. Grade 1 L4-5 anterolisthesis. L5   laminectomy with posterior fusion at L5 and S1.    IMPRESSION:  Mid to distal small bowel obstruction. Ascites.    3.2 cm hemorrhagic left ovarian cyst. Recommend follow-up ultrasound to   assure resolution. Partial hysterectomy.    Findings were discussed with  FRIDA AGRCIA [id number] 2022 9:05 PM by   Dr. Dana Muñoz with read back confirmation.    < end of copied text >    Imaging Personally Reviewed:  [X] YES  [ ] NO

## 2022-04-19 NOTE — H&P ADULT - TENDERNESS
patient states this site of tenderness in changed from her initial presentation of right sided tenderness/LUQ/LLQ

## 2022-04-19 NOTE — DIETITIAN INITIAL EVALUATION ADULT - NUTRITIONGOAL OUTCOME1
pt to remain NPO until SBO is resolved and clear fluid diet can safely be initiated per MD discretion

## 2022-04-19 NOTE — DIETITIAN INITIAL EVALUATION ADULT - OTHER INFO
Per H&P, pt is a "46 year old female with history of gastric band 2007 with subsequent removal and conversion to sleeve gastrectomy in 2018 presented to [place] [facility] with complaint of Right lower abdominal pain beginning 4/17/2022 which she described as sharp, crampy and constant which was associated with 3 episodes of vomiting on 4/18/2022.  Now patient states her pain has improved, is intermittent and well controlled with pain meds.  Denies N/V at this time.  Denies flatus and BM, states she had a sip of water today and did not develop N/V following.  Of note this is her first episode of SBO."    Pt seen for nutrition assessment secondary to hx bariatric sx. Per H&P, pt is a "46 year old female with history of gastric band 2007 with subsequent removal and conversion to sleeve gastrectomy in 2018 presented to [place] ER with complaint of Right lower abdominal pain beginning 4/17/2022 which she described as sharp, crampy and constant which was associated with 3 episodes of vomiting on 4/18/2022.  Now patient states her pain has improved, is intermittent and well controlled with pain meds.  Denies N/V at this time.  Denies flatus and BM, states she had a sip of water today and did not develop N/V following.  Of note this is her first episode of SBO."    Pt seen for nutrition assessment secondary to hx bariatric sx.  Pt s/p gastric band placement in 2007, with removal of band and conversion to gastric sleeve in 2018.  Pt admitted with SBO, NGT unable to be placed per pt.  NPO status maintained.  Pt able to verbalize understanding of nutrition for hx bariatric sx - per discussion, pt consumes balanced meals (rich in lean source of protein, fresh vegetables and fruits, no carbonated beverages, consumes water, limited refined starches).  Takes vitamin supplementation (MVI) and probiotic, reports she follows up with MD and gets other labs checked regularly for deficiencies.  In addition, to following up with MD, pt also endorses using [employer] marbin.  #, per pt, goal weight is 128# per MD recommendation/plan; states she has gradually been losing weight (~1#/wk on avg).  Pt denies nutrition related questions or concerns related to hx bariatric sx.  Discussed usual diet progression given SBO; pt remains NPO with LR@100ml/hr.

## 2022-04-19 NOTE — DIETITIAN INITIAL EVALUATION ADULT - PERTINENT MEDS FT
MEDICATIONS  (STANDING):  lactated ringers. 1000 milliLiter(s) (100 mL/Hr) IV Continuous <Continuous>  levothyroxine Injectable 75 MICROGram(s) IV Push at bedtime  pantoprazole  Injectable 40 milliGRAM(s) IV Push daily    MEDICATIONS  (PRN):  acetaminophen   IVPB .. 1000 milliGRAM(s) IV Intermittent every 6 hours PRN Mild Pain (1 - 3)  ibuprofen IVPB .. 800 milliGRAM(s) IV Intermittent every 8 hours PRN Moderate Pain (4 - 6)  ondansetron Injectable 4 milliGRAM(s) IV Push every 6 hours PRN Nausea and/or Vomiting

## 2022-04-19 NOTE — H&P ADULT - NSICDXPASTSURGICALHX_GEN_ALL_CORE_FT
PAST SURGICAL HISTORY:  Admission for adjustment of gastric lap band 2007 removal 2018     Section      H/O abdominoplasty     H/O bilateral breast reduction surgery with breast implants  silicone    H/O myomectomy 2001    H/O spinal fusion 10/2017    H/O: hysterectomy 2014 fibroids    S/P laparoscopic sleeve gastrectomy 2018

## 2022-04-19 NOTE — H&P ADULT - HISTORY OF PRESENT ILLNESS
46 year old female with history of gastric band 2007 with subsequent removal and conversion to sleeve gastrectomy in 2018 presented to [place] [facility] with complaint of Right lower abdominal pain beginning 4/17/2022 which she described as sharp, crampy and constant which was associated with 3 episodes of vomiting on 4/18/2022.  Now patient states her pain has improved, is intermittent and well controlled with pain meds.  Denies N/V at this time.  Denies flatus and BM, states she had a sip of water today and did not develop N/V following.    Of note this is her first episode of SBO.

## 2022-04-19 NOTE — CONSULT NOTE ADULT - ASSESSMENT
46 year old female with history of gastric band 2007 with subsequent removal and conversion to sleeve gastrectomy in 2018 presented to [place] ER with complaint of Right lower abdominal pain beginning 4/17/2022 which she described as sharp, crampy and constant which was associated with 3 episodes of vomiting on 4/18/2022.  Now patient states her pain has improved, is intermittent and well controlled with pain meds.  Denies N/V at this time.  Denies flatus and BM, states she had a sip of water today and did not develop N/V following.    Of note this is her first episode of SBO. Patient is being seen for medical comanagement.

## 2022-04-20 ENCOUNTER — TRANSCRIPTION ENCOUNTER (OUTPATIENT)
Age: 47
End: 2022-04-20

## 2022-04-20 VITALS
HEART RATE: 78 BPM | RESPIRATION RATE: 916 BRPM | DIASTOLIC BLOOD PRESSURE: 67 MMHG | SYSTOLIC BLOOD PRESSURE: 108 MMHG | TEMPERATURE: 98 F

## 2022-04-20 LAB
ANION GAP SERPL CALC-SCNC: 9 MMOL/L — SIGNIFICANT CHANGE UP (ref 5–17)
BUN SERPL-MCNC: 12 MG/DL — SIGNIFICANT CHANGE UP (ref 7–23)
CALCIUM SERPL-MCNC: 9.3 MG/DL — SIGNIFICANT CHANGE UP (ref 8.4–10.5)
CHLORIDE SERPL-SCNC: 105 MMOL/L — SIGNIFICANT CHANGE UP (ref 96–108)
CO2 SERPL-SCNC: 25 MMOL/L — SIGNIFICANT CHANGE UP (ref 22–31)
CREAT SERPL-MCNC: 0.69 MG/DL — SIGNIFICANT CHANGE UP (ref 0.5–1.3)
EGFR: 108 ML/MIN/1.73M2 — SIGNIFICANT CHANGE UP
GLUCOSE SERPL-MCNC: 90 MG/DL — SIGNIFICANT CHANGE UP (ref 70–99)
HCT VFR BLD CALC: 40.3 % — SIGNIFICANT CHANGE UP (ref 34.5–45)
HGB BLD-MCNC: 13.3 G/DL — SIGNIFICANT CHANGE UP (ref 11.5–15.5)
MCHC RBC-ENTMCNC: 28.7 PG — SIGNIFICANT CHANGE UP (ref 27–34)
MCHC RBC-ENTMCNC: 33 GM/DL — SIGNIFICANT CHANGE UP (ref 32–36)
MCV RBC AUTO: 86.9 FL — SIGNIFICANT CHANGE UP (ref 80–100)
NRBC # BLD: 0 /100 WBCS — SIGNIFICANT CHANGE UP (ref 0–0)
PLATELET # BLD AUTO: 276 K/UL — SIGNIFICANT CHANGE UP (ref 150–400)
POTASSIUM SERPL-MCNC: 4.4 MMOL/L — SIGNIFICANT CHANGE UP (ref 3.5–5.3)
POTASSIUM SERPL-SCNC: 4.4 MMOL/L — SIGNIFICANT CHANGE UP (ref 3.5–5.3)
RBC # BLD: 4.64 M/UL — SIGNIFICANT CHANGE UP (ref 3.8–5.2)
RBC # FLD: 12.1 % — SIGNIFICANT CHANGE UP (ref 10.3–14.5)
SODIUM SERPL-SCNC: 139 MMOL/L — SIGNIFICANT CHANGE UP (ref 135–145)
WBC # BLD: 7.01 K/UL — SIGNIFICANT CHANGE UP (ref 3.8–10.5)
WBC # FLD AUTO: 7.01 K/UL — SIGNIFICANT CHANGE UP (ref 3.8–10.5)

## 2022-04-20 PROCEDURE — 85025 COMPLETE CBC W/AUTO DIFF WBC: CPT

## 2022-04-20 PROCEDURE — 99232 SBSQ HOSP IP/OBS MODERATE 35: CPT

## 2022-04-20 PROCEDURE — 96375 TX/PRO/DX INJ NEW DRUG ADDON: CPT

## 2022-04-20 PROCEDURE — 85027 COMPLETE CBC AUTOMATED: CPT

## 2022-04-20 PROCEDURE — 71045 X-RAY EXAM CHEST 1 VIEW: CPT

## 2022-04-20 PROCEDURE — 81025 URINE PREGNANCY TEST: CPT

## 2022-04-20 PROCEDURE — 74177 CT ABD & PELVIS W/CONTRAST: CPT | Mod: MA

## 2022-04-20 PROCEDURE — 84702 CHORIONIC GONADOTROPIN TEST: CPT

## 2022-04-20 PROCEDURE — 96374 THER/PROPH/DIAG INJ IV PUSH: CPT

## 2022-04-20 PROCEDURE — 80048 BASIC METABOLIC PNL TOTAL CA: CPT

## 2022-04-20 PROCEDURE — 81001 URINALYSIS AUTO W/SCOPE: CPT

## 2022-04-20 PROCEDURE — 99285 EMERGENCY DEPT VISIT HI MDM: CPT | Mod: 25

## 2022-04-20 PROCEDURE — 83605 ASSAY OF LACTIC ACID: CPT

## 2022-04-20 PROCEDURE — 36415 COLL VENOUS BLD VENIPUNCTURE: CPT

## 2022-04-20 PROCEDURE — 80053 COMPREHEN METABOLIC PANEL: CPT

## 2022-04-20 PROCEDURE — 87635 SARS-COV-2 COVID-19 AMP PRB: CPT

## 2022-04-20 PROCEDURE — 83690 ASSAY OF LIPASE: CPT

## 2022-04-20 PROCEDURE — 93005 ELECTROCARDIOGRAM TRACING: CPT

## 2022-04-20 RX ADMIN — HEPARIN SODIUM 5000 UNIT(S): 5000 INJECTION INTRAVENOUS; SUBCUTANEOUS at 05:10

## 2022-04-20 NOTE — PROGRESS NOTE ADULT - SUBJECTIVE AND OBJECTIVE BOX
Bariatric/Surgery - Progress Note PA    46 year old female with history of gastric band 2007 with subsequent removal and conversion to sleeve gastrectomy in 2018 presented to [facility] [facility] with complaint of Right lower abdominal pain beginning 2022 which she described as sharp, crampy and constant which was associated with 3 episodes of vomiting on 2022.  Of note this is her first episode of SBO.      SUBJECTIVE:   Patient seen at bedside, no overnight events, no complaints noted and pain is controlled at this time. Patient feels well enough to go home  Patient admits to flatus, bowel movement at 7am this morning.   Patient denies any headaches, chest pain, shortness of breath, nausea, vomiting, fever, chills, weakness, dysuria  Patient A+Ox3 in NAD at time of visit.    OBJECTIVE:   T(C): 36.8 (22 @ 05:00), Max: 37 (22 @ 18:00)  HR: 72 (22 @ 05:00) (72 - 84)  BP: 105/64 (22 @ 05:00) (102/63 - 108/68)  RR: 20 (22 @ 05:00) (20 - 20)  SpO2: 97% (22 @ 05:00) (97% - 98%)  CAPILLARY BLOOD GLUCOSE    I&O's Detail    2022 07:01  -  2022 07:00  --------------------------------------------------------  IN:    Lactated Ringers: 600 mL  Total IN: 600 mL    OUT:  Total OUT: 0 mL    Total NET: 600 mL    Physical exam:  General: A+O x 3 in NAD  Chest: Clear through auscultation bilaterally, No rales, rhonchi wheezes noted bilaterally  Heart: S1,S1 RRR, no murmurs noted  Abdomen: soft non distended, non tender, non tympanic, BS x 4, no guarding noted. Prior well healed incisions from bariatric surgery 2018  Extremities: no edema noted, warm,  no calf tenderness     MEDICATIONS  (STANDING):  heparin   Injectable 5000 Unit(s) SubCutaneous every 12 hours  lactated ringers. 1000 milliLiter(s) (50 mL/Hr) IV Continuous <Continuous>  levothyroxine Injectable 75 MICROGram(s) IV Push at bedtime  pantoprazole  Injectable 40 milliGRAM(s) IV Push daily    MEDICATIONS  (PRN):  acetaminophen   IVPB .. 1000 milliGRAM(s) IV Intermittent every 6 hours PRN Mild Pain (1 - 3)  ibuprofen IVPB .. 800 milliGRAM(s) IV Intermittent every 8 hours PRN Moderate Pain (4 - 6)  ondansetron Injectable 4 milliGRAM(s) IV Push every 6 hours PRN Nausea and/or Vomiting      LABS:                        13.3   7.01  )-----------( 276      ( 2022 08:11 )             40.3     04-20    139  |  105  |  12  ----------------------------<  90  4.4   |  25  |  0.69    Ca    9.3      2022 08:11    TPro  5.7<L>  /  Alb  3.0<L>  /  TBili  0.6  /  DBili  x   /  AST  13  /  ALT  18  /  AlkPhos  48  04-19      Urinalysis Basic - ( 2022 18:51 )    Color: Yellow / Appearance: Clear / S.015 / pH: x  Gluc: x / Ketone: Moderate  / Bili: Negative / Urobili: Negative mg/dL   Blood: x / Protein: 15 mg/dL / Nitrite: Negative   Leuk Esterase: Negative / RBC: 0-2 /HPF / WBC Negative   Sq Epi: x / Non Sq Epi: Few / Bacteria: Occasional      Assessment/Plan  Patient is a 46y old Female who presents with a chief complaint of SBO (2022 16:01)  Heparin  LR  Ofirmiv and Caldolor  Synthroid  Protonix  Incentive  Sequentials  Ambulating  Advancing now to regular diet  Labs NL WBC from 11 to 7  If tolerating regular diet can discharge per Dr. Stewart with followup in 10 days   Bariatric/Surgery - Progress Note PA    46 year old female with history of gastric band 2007 with subsequent removal and conversion to sleeve gastrectomy in 2018 presented to [place] [facility] with complaint of Right lower abdominal pain beginning 2022 which she described as sharp, crampy and constant which was associated with 3 episodes of vomiting on 2022.  Of note this is her first episode of SBO.      SUBJECTIVE:   Patient seen at bedside, no overnight events, no complaints noted and pain is controlled at this time. Patient feels well enough to go home  Patient admits to flatus, bowel movement at 7am this morning.   Patient denies any headaches, chest pain, shortness of breath, nausea, vomiting, fever, chills, weakness, dysuria  Patient A+Ox3 in NAD at time of visit.  Tolerating regular diet. Abdominal pain resolved.    OBJECTIVE:   T(C): 36.8 (22 @ 05:00), Max: 37 (22 @ 18:00)  HR: 72 (22 @ 05:00) (72 - 84)  BP: 105/64 (22 @ 05:00) (102/63 - 108/68)  RR: 20 (22 @ 05:00) (20 - 20)  SpO2: 97% (22 @ 05:00) (97% - 98%)  CAPILLARY BLOOD GLUCOSE    I&O's Detail    2022 07:01  -  2022 07:00  --------------------------------------------------------  IN:    Lactated Ringers: 600 mL  Total IN: 600 mL    OUT:  Total OUT: 0 mL    Total NET: 600 mL    Physical exam:  General: A+O x 3 in NAD  Chest: Clear through auscultation bilaterally, No rales, rhonchi wheezes noted bilaterally  Heart: S1,S1 RRR, no murmurs noted  Abdomen: soft, mildly distended, non tender, non tympanic, BS x 4, no guarding noted. Prior well healed incisions from bariatric surgery 2018  Extremities: no edema noted, warm,  no calf tenderness     MEDICATIONS  (STANDING):  heparin   Injectable 5000 Unit(s) SubCutaneous every 12 hours  lactated ringers. 1000 milliLiter(s) (50 mL/Hr) IV Continuous <Continuous>  levothyroxine Injectable 75 MICROGram(s) IV Push at bedtime  pantoprazole  Injectable 40 milliGRAM(s) IV Push daily    MEDICATIONS  (PRN):  acetaminophen   IVPB .. 1000 milliGRAM(s) IV Intermittent every 6 hours PRN Mild Pain (1 - 3)  ibuprofen IVPB .. 800 milliGRAM(s) IV Intermittent every 8 hours PRN Moderate Pain (4 - 6)  ondansetron Injectable 4 milliGRAM(s) IV Push every 6 hours PRN Nausea and/or Vomiting      LABS:                        13.3   7.01  )-----------( 276      ( 2022 08:11 )             40.3     04-20    139  |  105  |  12  ----------------------------<  90  4.4   |  25  |  0.69    Ca    9.3      2022 08:11    TPro  5.7<L>  /  Alb  3.0<L>  /  TBili  0.6  /  DBili  x   /  AST  13  /  ALT  18  /  AlkPhos  48  04-19      Urinalysis Basic - ( 2022 18:51 )    Color: Yellow / Appearance: Clear / S.015 / pH: x  Gluc: x / Ketone: Moderate  / Bili: Negative / Urobili: Negative mg/dL   Blood: x / Protein: 15 mg/dL / Nitrite: Negative   Leuk Esterase: Negative / RBC: 0-2 /HPF / WBC Negative   Sq Epi: x / Non Sq Epi: Few / Bacteria: Occasional      Assessment/Plan  Patient is a 46y old Female who presents with a chief complaint of SBO (2022 16:01)  Heparin  LR  Ofirmiv and Caldolor  Synthroid  Protonix  Incentive  Sequentials  Ambulating  Advancing now to regular diet  Labs NL WBC from 11 to 7  If tolerating regular diet can discharge per Dr. Stewart with followup in 10 days

## 2022-04-20 NOTE — DISCHARGE NOTE PROVIDER - NSDCACTIVITY_GEN_ALL_CORE
Return to Work/School allowed/Bathing allowed/Showering allowed/Stairs allowed/Driving allowed/Walking - Indoors allowed/Walking - Outdoors allowed/Follow Instructions Provided by your Surgical Team

## 2022-04-20 NOTE — PROGRESS NOTE ADULT - NS ATTEND AMEND GEN_ALL_CORE FT
I have personally seen and examined the patient.  I fully participated in the care of this patient.  I have made amendments to the documentation where necessary, and agree with the history, physical exam, impression/assessment, and plan as documented by the PA    Patient doing well, +Gi function, tolerating regular diet. Patient eager to go home. Stable for discharge home, f/u with me in office.

## 2022-04-20 NOTE — DISCHARGE NOTE PROVIDER - HOSPITAL COURSE
Pt is a 46 year old female with history of gastric band 2007 with subsequent removal and conversion to sleeve gastrectomy in 2018 presented to [facility] with complaint of Right lower abdominal pain beginning 4/17/2022.  Pain was described as sharp, crampy and constant which was associated with 3 episodes of vomiting on 4/18/2022.    Of note this was her first episode of SBO.    CT Impression on 4/18   Mid to distal small bowel obstruction. Ascites.    3.2 cm hemorrhagic left ovarian cyst. Recommend follow-up ultrasound to   assure resolution. Partial hysterectomy.    Now patient states her pain had improved - intermittent, and well controlled with pain meds.    Denied N/V, flatus or BM on 4/19.    Patient was admitted to Dr. Stewart's service.  Pt failed attempted NGT placement (deviated septum) and was made NPO on admission. Following flatus was advanced to clears then fulls and after BM on morning of 4/20 was advanced to regular diet.    A medical consultation from the Hospitalist service was obtained for medical co-management.  Lab work ordering/results was followed by the Medical/Surgical team.    Discharge instructions were delineated in the Discharge Plan and reviewed with the patient.  All medications were listed in the medication reconciliation document, and casillas points were reviewed with the patient.  The patient was deemed stable for discharge today.  Upon discharge, the patient will following up with Dr Stewart whose office number may be found on the discharge instructions.     Pt is a 46 year old female with history of gastric band 2007 with subsequent removal and conversion to sleeve gastrectomy in 2018 presented to [facility] with complaint of Right lower abdominal pain beginning 4/17/2022.  Pain was described as sharp, crampy and constant which was associated with 3 episodes of vomiting on 4/18/2022.    Of note this was her first episode of SBO.    CT Impression on 4/18   Mid to distal small bowel obstruction. Ascites.    3.2 cm hemorrhagic left ovarian cyst. Recommend follow-up ultrasound to   assure resolution. Partial hysterectomy.    Now patient states her pain had improved - intermittent, and well controlled with pain meds.    Denied N/V, flatus or BM on 4/19.    Patient was admitted to Dr. Stewart's service.  Pt failed attempted NGT placement (deviated septum) and was made NPO on admission. Serial bowel films performed and reviewed.  Following flatus was advanced to clears then fulls and after BM on morning of 4/20 was advanced to regular diet.    A medical consultation from the Hospitalist service was obtained for medical co-management.  Lab work ordering/results was followed by the Medical/Surgical team.    Discharge instructions were delineated in the Discharge Plan and reviewed with the patient.  All medications were listed in the medication reconciliation document, and casillas points were reviewed with the patient.  The patient was deemed stable for discharge today.  Upon discharge, the patient will following up with Dr Stewart whose office number may be found on the discharge instructions.

## 2022-04-20 NOTE — DISCHARGE NOTE PROVIDER - NSDCMRMEDTOKEN_GEN_ALL_CORE_FT
One A Day Women&#x27;s Complete: 1 tab(s) chewed once a day  Synthroid 150 mcg (0.15 mg) oral tablet: 1 tab(s) orally once a day

## 2022-04-20 NOTE — PROGRESS NOTE ADULT - ASSESSMENT
46 year old female with history of gastric band 2007 with subsequent removal and conversion to sleeve gastrectomy in 2018 presented to [place] ER with complaint of Right lower abdominal pain beginning 4/17/2022 which she described as sharp, crampy and constant which was associated with 3 episodes of vomiting on 4/18/2022.  Now patient states her pain has improved, is intermittent and well controlled with pain meds.  Denies N/V at this time.  Denies flatus and BM, states she had a sip of water today and did not develop N/V following.    Of note this is her first episode of SBO. Patient is being seen for medical comanagement.

## 2022-04-20 NOTE — DISCHARGE NOTE NURSING/CASE MANAGEMENT/SOCIAL WORK - PATIENT PORTAL LINK FT
You can access the FollowMyHealth Patient Portal offered by [facility] by registering at the following website: [email or web address]. By joining [employer]’s FollowMyHealth portal, you will also be able to view your health information using other applications (apps) compatible with our system.

## 2022-04-20 NOTE — DISCHARGE NOTE NURSING/CASE MANAGEMENT/SOCIAL WORK - NSDCPEFALRISK_GEN_ALL_CORE
For information on Fall & Injury Prevention, visit: [email or web address] OR  [email or web address] OR  https://www.cdc.gov/steadi/patient.html

## 2022-04-20 NOTE — DISCHARGE NOTE PROVIDER - NSDCFUADDINST_GEN_ALL_CORE_FT
Follow all verbal and written instructions. Take medications as prescribed.  DO NOT hesitate to call Doctor's office with questions or concerns.    Follow all verbal and written instructions. Take medications as prescribed.  DO NOT hesitate to call Doctor's office with questions or concerns. Return to your regular bariatric diet.

## 2022-04-20 NOTE — DISCHARGE NOTE PROVIDER - CARE PROVIDER_API CALL
Chante Stewart)  Surgery; Surgical Critical Care  [address] [place] [address]  Phone: [phone]  Fax: [phone]  Follow Up Time:

## 2022-04-20 NOTE — PROGRESS NOTE ADULT - SUBJECTIVE AND OBJECTIVE BOX
Patient is a 46y old  Female who presents with a chief complaint of SBO (2022 09:19)    HPI: 46 year old female with history of gastric band 2007 with subsequent removal and conversion to sleeve gastrectomy in 2018 presented to [place] with complaint of Right lower abdominal pain beginning 2022 which she described as sharp, crampy and constant which was associated with 3 episodes of vomiting on 2022.  Now patient states her pain has improved, is intermittent and well controlled with pain meds.  Denies N/V at this time.  Denies flatus and BM, states she had a sip of water today and did not develop N/V following.    Of note this is her first episode of SBO. (2022 09:17)    INTERVAL HPI/OVERNIGHT EVENTS:  Chart reviewed, notes reviewed.   Patient seen and examined.  Being followed by following specialists: Bariatric surgery     Consultant(s) Notes Reviewed:  [X] Yes    Care Discussed with Consultants/Other Providers: [X] Yes        REVIEW OF SYSTEMS:  CONSTITUTIONAL: No fever, weight loss, or fatigue  EYES: No eye pain, or discharge  ENMT: No sinus or throat pain  NECK: No pain or stiffness  BREASTS: No pain, masses, or nipple discharge  RESPIRATORY: No cough, wheezing, chills or hemoptysis; No shortness of breath  CARDIOVASCULAR: No chest pain, palpitations, dizziness, or leg swelling  GASTROINTESTINAL: No abdominal or epigastric pain. No nausea, vomiting.  GENITOURINARY: No dysuria, frequency, hematuria, or incontinence  NEUROLOGICAL: No loss of strength, numbness, or tremors  SKIN: No itching, burning, rashes, or lesions   LYMPH NODES: No enlarged glands  ENDOCRINE: No polydipsia or polyuria  MUSCULOSKELETAL: No muscle, back, or extremity pain  PSYCHIATRIC: No depression, anxiety, mood swings.  HEME/LYMPH: No easy bruising, or bleeding gums  ALLERGY AND IMMUNOLOGIC: No hives or eczema    Allergies    No Known Allergies    Intolerances      Home Medications:  One A Day Women&#x27;s Complete: 1 tab(s) chewed once a day (2022 09:22)  Synthroid 150 mcg (0.15 mg) oral tablet: 1 tab(s) orally once a day (2022 09:22)    MEDICATIONS  (STANDING):  heparin   Injectable 5000 Unit(s) SubCutaneous every 12 hours  lactated ringers. 1000 milliLiter(s) (50 mL/Hr) IV Continuous <Continuous>  levothyroxine Injectable 75 MICROGram(s) IV Push at bedtime  pantoprazole  Injectable 40 milliGRAM(s) IV Push daily    MEDICATIONS  (PRN):  acetaminophen   IVPB .. 1000 milliGRAM(s) IV Intermittent every 6 hours PRN Mild Pain (1 - 3)  ibuprofen IVPB .. 800 milliGRAM(s) IV Intermittent every 8 hours PRN Moderate Pain (4 - 6)  ondansetron Injectable 4 milliGRAM(s) IV Push every 6 hours PRN Nausea and/or Vomiting    Vital Signs Last 24 Hrs  T(C): 36.5 (2022 13:31), Max: 37 (2022 18:00)  T(F): 97.7 (2022 13:31), Max: 98.6 (2022 18:00)  HR: 78 (2022 13:31) (72 - 84)  BP: 108/67 (2022 13:31) (102/63 - 112/73)  BP(mean): --  RR: 916 (2022 13:31) (16 - 916)  SpO2: 98% (2022 09:59) (97% - 98%)    PHYSICAL EXAM:  GENERAL: NAD, well-groomed, well-developed  HEAD:  Atraumatic, Normocephalic  EYES: EOMI, PERRLA, conjunctiva and sclera clear  ENMT: Moist mucous membranes, no lesions  NECK: Supple.  CHEST/LUNG: Clear to auscultation bilaterally; No rales, rhonchi, wheezing, or rubs  HEART: S1, S2.   ABDOMEN: Soft, Nontender, Nondistended; Bowel sounds present  EXTREMITIES:  2+ Peripheral Pulses, No clubbing, cyanosis, or edema  MS: No joint swelling or deformity.   LYMPH: No lymphadenopathy noted  SKIN: No rashes or lesions  NERVOUS SYSTEM:  No focal deficit.   PSYCH:  Awake and alert.   LABS:                         13.3   7.01  )-----------( 276      ( 2022 08:11 )             40.3     2022 08:11    139    |  105    |  12     ----------------------------<  90     4.4     |  25     |  0.69     Ca    9.3        2022 08:11    TPro  5.7    /  Alb  3.0    /  TBili  0.6    /  DBili  x      /  AST  13     /  ALT  18     /  AlkPhos  48     2022 07:39    CAPILLARY BLOOD GLUCOSE                          Urinalysis Basic - ( 2022 18:51 )    Color: Yellow / Appearance: Clear / S.015 / pH: x  Gluc: x / Ketone: Moderate  / Bili: Negative / Urobili: Negative mg/dL   Blood: x / Protein: 15 mg/dL / Nitrite: Negative   Leuk Esterase: Negative / RBC: 0-2 /HPF / WBC Negative   Sq Epi: x / Non Sq Epi: Few / Bacteria: Occasional          RADIOLOGY TEST: (IMAGES REVIEWED BY ME)    Imaging Personally Reviewed:  [X] YES      HEALTH ISSUES - PROBLEM Dx:  SBO (small bowel obstruction)    Hypothyroidism    GERD (gastroesophageal reflux disease)    Prophylactic measure         Patient is a 46y old  Female who presents with a chief complaint of SBO (2022 09:19)    HPI: 46 year old female with history of gastric band 2007 with subsequent removal and conversion to sleeve gastrectomy in 2018 presented to [place] with complaint of Right lower abdominal pain beginning 2022 which she described as sharp, crampy and constant which was associated with 3 episodes of vomiting on 2022.  Now patient states her pain has improved, is intermittent and well controlled with pain meds.  Denies N/V at this time.  Denies flatus and BM, states she had a sip of water today and did not develop N/V following.    Of note this is her first episode of SBO. (2022 09:17)    INTERVAL HPI/OVERNIGHT EVENTS:  Chart reviewed, notes reviewed.   Patient seen and examined.  Being followed by following specialists: Bariatric surgery     Consultant(s) Notes Reviewed:  [X] Yes    Care Discussed with Consultants/Other Providers: [X] Yes    2022 --> Doing well. Moved her bowels this morning. Tolerating regular diet. No nausea or vomiting. No abdominal pain.       REVIEW OF SYSTEMS:  CONSTITUTIONAL: No fever, weight loss, or fatigue  EYES: No eye pain, or discharge  ENMT: No sinus or throat pain  NECK: No pain or stiffness  BREASTS: No pain, masses, or nipple discharge  RESPIRATORY: No cough, wheezing, chills or hemoptysis; No shortness of breath  CARDIOVASCULAR: No chest pain, palpitations, dizziness, or leg swelling  GASTROINTESTINAL: No abdominal or epigastric pain. No nausea, vomiting.  GENITOURINARY: No dysuria, frequency, hematuria, or incontinence  NEUROLOGICAL: No loss of strength, numbness, or tremors  SKIN: No itching, burning, rashes, or lesions   LYMPH NODES: No enlarged glands  ENDOCRINE: No polydipsia or polyuria  MUSCULOSKELETAL: No muscle, back, or extremity pain  PSYCHIATRIC: No depression, anxiety, mood swings.  HEME/LYMPH: No easy bruising, or bleeding gums  ALLERGY AND IMMUNOLOGIC: No hives or eczema    Allergies    No Known Allergies    Intolerances      Home Medications:  One A Day Women&#x27;s Complete: 1 tab(s) chewed once a day (2022 09:22)  Synthroid 150 mcg (0.15 mg) oral tablet: 1 tab(s) orally once a day (2022 09:22)    MEDICATIONS  (STANDING):  heparin   Injectable 5000 Unit(s) SubCutaneous every 12 hours  lactated ringers. 1000 milliLiter(s) (50 mL/Hr) IV Continuous <Continuous>  levothyroxine Injectable 75 MICROGram(s) IV Push at bedtime  pantoprazole  Injectable 40 milliGRAM(s) IV Push daily    MEDICATIONS  (PRN):  acetaminophen   IVPB .. 1000 milliGRAM(s) IV Intermittent every 6 hours PRN Mild Pain (1 - 3)  ibuprofen IVPB .. 800 milliGRAM(s) IV Intermittent every 8 hours PRN Moderate Pain (4 - 6)  ondansetron Injectable 4 milliGRAM(s) IV Push every 6 hours PRN Nausea and/or Vomiting    Vital Signs Last 24 Hrs  T(C): 36.5 (2022 13:31), Max: 37 (2022 18:00)  T(F): 97.7 (2022 13:31), Max: 98.6 (2022 18:00)  HR: 78 (2022 13:31) (72 - 84)  BP: 108/67 (2022 13:31) (102/63 - 112/73)  BP(mean): --  RR: 916 (2022 13:31) (16 - 916)  SpO2: 98% (2022 09:59) (97% - 98%)    PHYSICAL EXAM:  GENERAL: NAD, well-groomed, well-developed  HEAD:  Atraumatic, Normocephalic  EYES: EOMI, PERRLA, conjunctiva and sclera clear  ENMT: Moist mucous membranes, no lesions  NECK: Supple.  CHEST/LUNG: Clear to auscultation bilaterally; No rales, rhonchi, wheezing, or rubs  HEART: S1, S2.   ABDOMEN: Soft, Nontender, Nondistended; Bowel sounds present  EXTREMITIES:  2+ Peripheral Pulses, No clubbing, cyanosis, or edema  MS: No joint swelling or deformity.   LYMPH: No lymphadenopathy noted  SKIN: No rashes or lesions  NERVOUS SYSTEM:  No focal deficit.   PSYCH:  Awake and alert.   LABS:                         13.3   7.01  )-----------( 276      ( 2022 08:11 )             40.3     2022 08:11    139    |  105    |  12     ----------------------------<  90     4.4     |  25     |  0.69     Ca    9.3        2022 08:11    TPro  5.7    /  Alb  3.0    /  TBili  0.6    /  DBili  x      /  AST  13     /  ALT  18     /  AlkPhos  48     2022 07:39    Urinalysis Basic - ( 2022 18:51 )    Color: Yellow / Appearance: Clear / S.015 / pH: x  Gluc: x / Ketone: Moderate  / Bili: Negative / Urobili: Negative mg/dL   Blood: x / Protein: 15 mg/dL / Nitrite: Negative   Leuk Esterase: Negative / RBC: 0-2 /HPF / WBC Negative   Sq Epi: x / Non Sq Epi: Few / Bacteria: Occasional      RADIOLOGY TEST: (IMAGES REVIEWED BY ME)    Imaging Personally Reviewed:  [X] YES      HEALTH ISSUES - PROBLEM Dx:  SBO (small bowel obstruction)    Hypothyroidism    GERD (gastroesophageal reflux disease)    Prophylactic measure

## 2022-11-14 NOTE — ASU DISCHARGE PLAN (ADULT/PEDIATRIC) - CALL YOUR DOCTOR IF YOU HAVE ANY OF THE FOLLOWING:
Swelling that gets worse/Wound/Surgical Site with redness, or foul smelling discharge or pus
14-Nov-2022 03:12

## 2023-09-25 ENCOUNTER — INPATIENT (INPATIENT)
Facility: HOSPITAL | Age: 48
LOS: 1 days | Discharge: HOME | DRG: 389 | End: 2023-09-27
Attending: SURGERY | Admitting: SURGERY
Payer: MEDICARE

## 2023-09-25 VITALS
SYSTOLIC BLOOD PRESSURE: 115 MMHG | TEMPERATURE: 98 F | RESPIRATION RATE: 18 BRPM | HEART RATE: 65 BPM | WEIGHT: 164.91 LBS | OXYGEN SATURATION: 98 % | DIASTOLIC BLOOD PRESSURE: 79 MMHG | HEIGHT: 67 IN

## 2023-09-25 DIAGNOSIS — Z90.710 ACQUIRED ABSENCE OF BOTH CERVIX AND UTERUS: Chronic | ICD-10-CM

## 2023-09-25 DIAGNOSIS — Z98.84 BARIATRIC SURGERY STATUS: Chronic | ICD-10-CM

## 2023-09-25 DIAGNOSIS — K56.609 UNSPECIFIED INTESTINAL OBSTRUCTION, UNSPECIFIED AS TO PARTIAL VERSUS COMPLETE OBSTRUCTION: ICD-10-CM

## 2023-09-25 DIAGNOSIS — Z98.890 OTHER SPECIFIED POSTPROCEDURAL STATES: Chronic | ICD-10-CM

## 2023-09-25 DIAGNOSIS — Z46.51 ENCOUNTER FOR FITTING AND ADJUSTMENT OF GASTRIC LAP BAND: Chronic | ICD-10-CM

## 2023-09-25 DIAGNOSIS — Z98.1 ARTHRODESIS STATUS: Chronic | ICD-10-CM

## 2023-09-25 LAB
ALBUMIN SERPL ELPH-MCNC: 3.8 G/DL — SIGNIFICANT CHANGE UP (ref 3.3–5)
ALP SERPL-CCNC: 70 U/L — SIGNIFICANT CHANGE UP (ref 40–120)
ALT FLD-CCNC: 27 U/L — SIGNIFICANT CHANGE UP (ref 12–78)
ANION GAP SERPL CALC-SCNC: 7 MMOL/L — SIGNIFICANT CHANGE UP (ref 5–17)
APTT BLD: 29.1 SEC — SIGNIFICANT CHANGE UP (ref 24.5–35.6)
AST SERPL-CCNC: 12 U/L — LOW (ref 15–37)
BASOPHILS # BLD AUTO: 0.06 K/UL — SIGNIFICANT CHANGE UP (ref 0–0.2)
BASOPHILS NFR BLD AUTO: 0.4 % — SIGNIFICANT CHANGE UP (ref 0–2)
BILIRUB SERPL-MCNC: 0.8 MG/DL — SIGNIFICANT CHANGE UP (ref 0.2–1.2)
BUN SERPL-MCNC: 23 MG/DL — SIGNIFICANT CHANGE UP (ref 7–23)
CALCIUM SERPL-MCNC: 9.2 MG/DL — SIGNIFICANT CHANGE UP (ref 8.5–10.1)
CHLORIDE SERPL-SCNC: 105 MMOL/L — SIGNIFICANT CHANGE UP (ref 96–108)
CO2 SERPL-SCNC: 26 MMOL/L — SIGNIFICANT CHANGE UP (ref 22–31)
CREAT SERPL-MCNC: 0.75 MG/DL — SIGNIFICANT CHANGE UP (ref 0.5–1.3)
EGFR: 98 ML/MIN/1.73M2 — SIGNIFICANT CHANGE UP
EOSINOPHIL # BLD AUTO: 0.02 K/UL — SIGNIFICANT CHANGE UP (ref 0–0.5)
EOSINOPHIL NFR BLD AUTO: 0.1 % — SIGNIFICANT CHANGE UP (ref 0–6)
GLUCOSE SERPL-MCNC: 125 MG/DL — HIGH (ref 70–99)
HCG SERPL-ACNC: <1 MIU/ML — SIGNIFICANT CHANGE UP
HCT VFR BLD CALC: 45 % — SIGNIFICANT CHANGE UP (ref 34.5–45)
HGB BLD-MCNC: 14.8 G/DL — SIGNIFICANT CHANGE UP (ref 11.5–15.5)
IMM GRANULOCYTES NFR BLD AUTO: 0.5 % — SIGNIFICANT CHANGE UP (ref 0–0.9)
INR BLD: 0.88 RATIO — SIGNIFICANT CHANGE UP (ref 0.85–1.18)
LACTATE SERPL-SCNC: 1.4 MMOL/L — SIGNIFICANT CHANGE UP (ref 0.7–2)
LIDOCAIN IGE QN: 47 U/L — SIGNIFICANT CHANGE UP (ref 13–75)
LYMPHOCYTES # BLD AUTO: 0.81 K/UL — LOW (ref 1–3.3)
LYMPHOCYTES # BLD AUTO: 4.8 % — LOW (ref 13–44)
MCHC RBC-ENTMCNC: 28.4 PG — SIGNIFICANT CHANGE UP (ref 27–34)
MCHC RBC-ENTMCNC: 32.9 GM/DL — SIGNIFICANT CHANGE UP (ref 32–36)
MCV RBC AUTO: 86.2 FL — SIGNIFICANT CHANGE UP (ref 80–100)
MONOCYTES # BLD AUTO: 0.35 K/UL — SIGNIFICANT CHANGE UP (ref 0–0.9)
MONOCYTES NFR BLD AUTO: 2.1 % — SIGNIFICANT CHANGE UP (ref 2–14)
NEUTROPHILS # BLD AUTO: 15.38 K/UL — HIGH (ref 1.8–7.4)
NEUTROPHILS NFR BLD AUTO: 92.1 % — HIGH (ref 43–77)
NRBC # BLD: 0 /100 WBCS — SIGNIFICANT CHANGE UP (ref 0–0)
PLATELET # BLD AUTO: 302 K/UL — SIGNIFICANT CHANGE UP (ref 150–400)
POTASSIUM SERPL-MCNC: 4.7 MMOL/L — SIGNIFICANT CHANGE UP (ref 3.5–5.3)
POTASSIUM SERPL-SCNC: 4.7 MMOL/L — SIGNIFICANT CHANGE UP (ref 3.5–5.3)
PROT SERPL-MCNC: 8.1 G/DL — SIGNIFICANT CHANGE UP (ref 6–8.3)
PROTHROM AB SERPL-ACNC: 10.3 SEC — SIGNIFICANT CHANGE UP (ref 9.5–13)
RBC # BLD: 5.22 M/UL — HIGH (ref 3.8–5.2)
RBC # FLD: 12.6 % — SIGNIFICANT CHANGE UP (ref 10.3–14.5)
SARS-COV-2 RNA SPEC QL NAA+PROBE: SIGNIFICANT CHANGE UP
SODIUM SERPL-SCNC: 138 MMOL/L — SIGNIFICANT CHANGE UP (ref 135–145)
WBC # BLD: 16.71 K/UL — HIGH (ref 3.8–10.5)
WBC # FLD AUTO: 16.71 K/UL — HIGH (ref 3.8–10.5)

## 2023-09-25 PROCEDURE — 93010 ELECTROCARDIOGRAM REPORT: CPT

## 2023-09-25 PROCEDURE — 99285 EMERGENCY DEPT VISIT HI MDM: CPT | Mod: FS

## 2023-09-25 PROCEDURE — 74177 CT ABD & PELVIS W/CONTRAST: CPT | Mod: 26,MA

## 2023-09-25 RX ORDER — PIPERACILLIN AND TAZOBACTAM 4; .5 G/20ML; G/20ML
3.38 INJECTION, POWDER, LYOPHILIZED, FOR SOLUTION INTRAVENOUS ONCE
Refills: 0 | Status: COMPLETED | OUTPATIENT
Start: 2023-09-25 | End: 2023-09-25

## 2023-09-25 RX ORDER — ACETAMINOPHEN 500 MG
1000 TABLET ORAL ONCE
Refills: 0 | Status: DISCONTINUED | OUTPATIENT
Start: 2023-09-26 | End: 2023-09-27

## 2023-09-25 RX ORDER — IOHEXOL 300 MG/ML
30 INJECTION, SOLUTION INTRAVENOUS ONCE
Refills: 0 | Status: DISCONTINUED | OUTPATIENT
Start: 2023-09-25 | End: 2023-09-27

## 2023-09-25 RX ORDER — SODIUM CHLORIDE 9 MG/ML
1000 INJECTION INTRAMUSCULAR; INTRAVENOUS; SUBCUTANEOUS ONCE
Refills: 0 | Status: COMPLETED | OUTPATIENT
Start: 2023-09-25 | End: 2023-09-25

## 2023-09-25 RX ORDER — KETOROLAC TROMETHAMINE 30 MG/ML
15 SYRINGE (ML) INJECTION ONCE
Refills: 0 | Status: DISCONTINUED | OUTPATIENT
Start: 2023-09-25 | End: 2023-09-26

## 2023-09-25 RX ORDER — HYDROMORPHONE HYDROCHLORIDE 2 MG/ML
0.5 INJECTION INTRAMUSCULAR; INTRAVENOUS; SUBCUTANEOUS ONCE
Refills: 0 | Status: DISCONTINUED | OUTPATIENT
Start: 2023-09-25 | End: 2023-09-25

## 2023-09-25 RX ORDER — HYDROMORPHONE HYDROCHLORIDE 2 MG/ML
0.5 INJECTION INTRAMUSCULAR; INTRAVENOUS; SUBCUTANEOUS EVERY 6 HOURS
Refills: 0 | Status: DISCONTINUED | OUTPATIENT
Start: 2023-09-25 | End: 2023-09-27

## 2023-09-25 RX ORDER — ONDANSETRON 8 MG/1
4 TABLET, FILM COATED ORAL EVERY 6 HOURS
Refills: 0 | Status: DISCONTINUED | OUTPATIENT
Start: 2023-09-25 | End: 2023-09-27

## 2023-09-25 RX ORDER — ACETAMINOPHEN 500 MG
1000 TABLET ORAL ONCE
Refills: 0 | Status: COMPLETED | OUTPATIENT
Start: 2023-09-25 | End: 2023-09-25

## 2023-09-25 RX ADMIN — Medication 400 MILLIGRAM(S): at 19:36

## 2023-09-25 RX ADMIN — Medication 1000 MILLIGRAM(S): at 21:35

## 2023-09-25 RX ADMIN — PIPERACILLIN AND TAZOBACTAM 200 GRAM(S): 4; .5 INJECTION, POWDER, LYOPHILIZED, FOR SOLUTION INTRAVENOUS at 21:52

## 2023-09-25 RX ADMIN — HYDROMORPHONE HYDROCHLORIDE 0.5 MILLIGRAM(S): 2 INJECTION INTRAMUSCULAR; INTRAVENOUS; SUBCUTANEOUS at 22:49

## 2023-09-25 RX ADMIN — ONDANSETRON 4 MILLIGRAM(S): 8 TABLET, FILM COATED ORAL at 22:06

## 2023-09-25 RX ADMIN — SODIUM CHLORIDE 1000 MILLILITER(S): 9 INJECTION INTRAMUSCULAR; INTRAVENOUS; SUBCUTANEOUS at 21:54

## 2023-09-25 RX ADMIN — HYDROMORPHONE HYDROCHLORIDE 0.5 MILLIGRAM(S): 2 INJECTION INTRAMUSCULAR; INTRAVENOUS; SUBCUTANEOUS at 22:34

## 2023-09-25 RX ADMIN — Medication 1000 MILLIGRAM(S): at 19:51

## 2023-09-25 RX ADMIN — SODIUM CHLORIDE 1000 MILLILITER(S): 9 INJECTION INTRAMUSCULAR; INTRAVENOUS; SUBCUTANEOUS at 19:36

## 2023-09-25 NOTE — H&P ADULT - NSHPLABSRESULTS_GEN_ALL_CORE
14.8   16.71 )-----------( 302      ( 25 Sep 2023 18:30 )             45.0     09-25    138  |  105  |  23  ----------------------------<  125<H>  4.7   |  26  |  0.75    Ca    9.2      25 Sep 2023 18:30    TPro  8.1  /  Alb  3.8  /  TBili  0.8  /  DBili  x   /  AST  12<L>  /  ALT  27  /  AlkPhos  70  09-25    PT/INR - ( 25 Sep 2023 18:30 )   PT: 10.3 sec;   INR: 0.88 ratio    PTT - ( 25 Sep 2023 18:30 )  PTT:29.1 sec  Urinalysis Basic - ( 25 Sep 2023 18:30 )    Color: x / Appearance: x / SG: x / pH: x  Gluc: 125 mg/dL / Ketone: x  / Bili: x / Urobili: x   Blood: x / Protein: x / Nitrite: x   Leuk Esterase: x / RBC: x / WBC x   Sq Epi: x / Non Sq Epi: x / Bacteria: x  Lactate, Blood: 1.4 mmol/L (09-25 @ 21:15)  ---------------------------------------------------------------------------------------------------------------------------------------  ACC: [id number] EXAM:  CT ABDOMEN AND PELVIS OC IC   ORDERED BY: IDA VEGA   PROCEDURE DATE:  09/25/2023    COMPARISON: 4/18/2022.    FINDINGS:  LOWER CHEST: Mild bibasilar dependent atelectasis. Incompletely   visualized right breast implant.    BOWEL: Post gastric surgery. Small hiatus hernia. Dilated fluid-filled   small bowel loops within the central abdomen with abrupt transition in   the left para midline lower abdomen (series 2:85). Distal small bowel   loops are decompressed. Mild associated mesenteric edema. Appendix is   normal.  PERITONEUM: Small volume ascites. No pneumoperitoneum or loculated   collection. No mesenteric lymphadenopathy.  VESSELS: Within normal limits.  RETROPERITONEUM/LYMPH NODES: No lymphadenopathy.  ABDOMINAL WALL: Small injection granulomas in the gluteal subcutaneous   fat. Postsurgical changes.  BONES: Degenerative changes of the spine. Post laminectomy at L5 with   posterior lumbar fusion hardware at L5-S1. Mild anterolisthesis of L5 on   S1, unchanged.    IMPRESSION:  High-grade small bowel obstruction with abrupt transition point in the   left para midline lower abdomen (series 2:85). Mild associated mesenteric   edema and small volume ascites.    --- End of Report ---  CARMEN VARELA DO; Attending Radiologist

## 2023-09-25 NOTE — H&P ADULT - NSHPPHYSICALEXAM_GEN_ALL_CORE
General: No acute distress, appears comfortable,  well-groomed, appears stated age  Head, Eyes, Ears, Nose, Throat: Normal cephalic/atraumatic, anicteric, conjunctiva-non injected and moist, vision grossly intact, hearing grossly intact  Neck: Supple, trachea in the midline  Chest: Lungs are clear to P&A, no wheezing, no rales, no ronchi, with good inspiratory effort  Heart: Heart rhythm regular, no murmurs  Abdomen: , Moderately distended but soft, lower abdominal TTP b/L; No guarding, no rebound TTP, no lias peritonitic findings  Extremity: No swelling, or open sores, no gross deformities,  good range of motion  Neuro: Alert and oriented x3, motor and sensory intact  Skin: Good color, turgor, texture with no gross lesions, no eruptions, no rashes, no subcutaneous nodules and normal temperature.

## 2023-09-25 NOTE — ED ADULT NURSE NOTE - NSFALLUNIVINTERV_ED_ALL_ED
Bed/Stretcher in lowest position, wheels locked, appropriate side rails in place/Call bell, personal items and telephone in reach/Instruct patient to call for assistance before getting out of bed/chair/stretcher/Non-slip footwear applied when patient is off stretcher/[place] to call system/Physically safe environment - no spills, clutter or unnecessary equipment/Purposeful proactive rounding/Room/bathroom lighting operational, light cord in reach

## 2023-09-25 NOTE — ED ADULT NURSE REASSESSMENT NOTE - NSFALLUNIVINTERV_ED_ALL_ED
Bed/Stretcher in lowest position, wheels locked, appropriate side rails in place/Call bell, personal items and telephone in reach/Instruct patient to call for assistance before getting out of bed/chair/stretcher/Non-slip footwear applied when patient is off stretcher/[place] to call system/Physically safe environment - no spills, clutter or unnecessary equipment/Purposeful proactive rounding/Room/bathroom lighting operational, light cord in reach
Bed/Stretcher in lowest position, wheels locked, appropriate side rails in place/Call bell, personal items and telephone in reach/Instruct patient to call for assistance before getting out of bed/chair/stretcher/Non-slip footwear applied when patient is off stretcher/[place] to call system/Physically safe environment - no spills, clutter or unnecessary equipment/Purposeful proactive rounding/Room/bathroom lighting operational, light cord in reach

## 2023-09-25 NOTE — H&P ADULT - NSICDXPASTMEDICALHX_GEN_ALL_CORE_FT
PAST MEDICAL HISTORY:  GERD (gastroesophageal reflux disease)     Heart Murmur Patient state she was told she no longer has this    Hypothyroidism     Morbid obesity resolved with weight loss surgery

## 2023-09-25 NOTE — ED PROVIDER NOTE - OBJECTIVE STATEMENT
48-year-old female without reported past medical history presents today due to abdominal pain since last night.  Patient reports that she has a history of an obstruction in which she believes she has similar situation today.  Patient describes pain as aching, generalized, nonradiating, and currently 10 out of 10.  Patient has had multiple abdominal surgeries including myomectomy, tummy tuck, and gastric sleeve.  Patient reports having constipation and not passing gas.  Patient has been taking Dulcolax without relief.  Patient admits to nausea.  Patient denies vomiting, dysuria, hematuria, chest pain, shortness of breath, or any other complaints.

## 2023-09-25 NOTE — ED PROVIDER NOTE - PHYSICAL EXAMINATION

## 2023-09-25 NOTE — H&P ADULT - PROBLEM SELECTOR PLAN 2
- Pt takes 200 mcg Synthroid at home - Pt takes 200 mcg Synthroid at home  - Discussed PO-IV interchange w/ Pharmacy, will start 150 mcg IV Synthroid while inpt and resume PO formulation when able - Pt takes 200 mcg Synthroid at home  - Discussed PO-IV interchange w/ Pharmacy, will start 120 mcg IV Synthroid while inpt and resume PO formulation when able

## 2023-09-25 NOTE — H&P ADULT - HISTORY OF PRESENT ILLNESS
49 y/o female w/ PMH hypothyroidism, SBO & PSH myomectomy x3, partial hysterectomy, gastric band (2007), gastric sleeve (2018) p/w abdominal pain x1 day. Pt reports onset of pain last night which awoke her from sleep. Pt endorses similar pain from first episode of SBO (04/2022), generalized over her abdomen and 9/10 in severity. Pt denies vomiting, endorses mild nausea at present. Endorses occasional flatus.   Denies subjective fever, chills, Vomiting, change in urinary habits, BRBPR, melena or LOC

## 2023-09-25 NOTE — H&P ADULT - ASSESSMENT
49 y/o female w/ PMH hypothyroidism, SBO & PSH myomectomy x3, partial hysterectomy, gastric band (2007), gastric sleeve (2018) p/w abdominal pain x1 day, found to have SBO on admission CTAP w/ PO/IV contrast.  AFVSS, Leukocytosis to 16K.   Abdominal exam distended but soft, w/ tenderness in lower quadrants b/L.

## 2023-09-25 NOTE — H&P ADULT - PROBLEM SELECTOR PLAN 1
- NPO  - REAGAN  - Will forgo NGT as pt not actively vomiting and underdistended stomach on CT. Will serially assess need for placement  - AM Abdominal xray  - Pain control, antiemetics prn, supportive care

## 2023-09-25 NOTE — ED PROVIDER NOTE - CLINICAL SUMMARY MEDICAL DECISION MAKING FREE TEXT BOX
48-year-old female with past medical history of gastric banding 2007 with removal and conversion to a sleeve gastrectomy in 2019, total abdominal hysterectomy due to fibroids in 2014, history of small bowel obstruction in April 2022, presents to the emergency department complaining of abdominal pain, abdominal bloating, no bowel movement in 2 days, passing little gas, patient awake and alert, after pain medication patient feeling improved, abdomen soft, awaiting CT abdomen and pelvis, noted white count to be elevated at 16, will cover with Zosyn, will reevaluate.

## 2023-09-26 ENCOUNTER — TRANSCRIPTION ENCOUNTER (OUTPATIENT)
Age: 48
End: 2023-09-26

## 2023-09-26 DIAGNOSIS — K56.609 UNSPECIFIED INTESTINAL OBSTRUCTION, UNSPECIFIED AS TO PARTIAL VERSUS COMPLETE OBSTRUCTION: ICD-10-CM

## 2023-09-26 DIAGNOSIS — E03.9 HYPOTHYROIDISM, UNSPECIFIED: ICD-10-CM

## 2023-09-26 PROBLEM — E66.01 MORBID (SEVERE) OBESITY DUE TO EXCESS CALORIES: Chronic | Status: ACTIVE | Noted: 2018-06-22

## 2023-09-26 LAB
ANION GAP SERPL CALC-SCNC: 7 MMOL/L — SIGNIFICANT CHANGE UP (ref 5–17)
BUN SERPL-MCNC: 23 MG/DL — SIGNIFICANT CHANGE UP (ref 7–23)
CALCIUM SERPL-MCNC: 8 MG/DL — LOW (ref 8.5–10.1)
CHLORIDE SERPL-SCNC: 109 MMOL/L — HIGH (ref 96–108)
CO2 SERPL-SCNC: 23 MMOL/L — SIGNIFICANT CHANGE UP (ref 22–31)
CREAT SERPL-MCNC: 0.64 MG/DL — SIGNIFICANT CHANGE UP (ref 0.5–1.3)
EGFR: 109 ML/MIN/1.73M2 — SIGNIFICANT CHANGE UP
GLUCOSE SERPL-MCNC: 103 MG/DL — HIGH (ref 70–99)
HCT VFR BLD CALC: 38.1 % — SIGNIFICANT CHANGE UP (ref 34.5–45)
HGB BLD-MCNC: 12.8 G/DL — SIGNIFICANT CHANGE UP (ref 11.5–15.5)
MAGNESIUM SERPL-MCNC: 2.5 MG/DL — SIGNIFICANT CHANGE UP (ref 1.6–2.6)
MCHC RBC-ENTMCNC: 28.9 PG — SIGNIFICANT CHANGE UP (ref 27–34)
MCHC RBC-ENTMCNC: 33.6 GM/DL — SIGNIFICANT CHANGE UP (ref 32–36)
MCV RBC AUTO: 86 FL — SIGNIFICANT CHANGE UP (ref 80–100)
NRBC # BLD: 0 /100 WBCS — SIGNIFICANT CHANGE UP (ref 0–0)
PHOSPHATE SERPL-MCNC: 3.7 MG/DL — SIGNIFICANT CHANGE UP (ref 2.5–4.5)
PLATELET # BLD AUTO: 260 K/UL — SIGNIFICANT CHANGE UP (ref 150–400)
POTASSIUM SERPL-MCNC: 3.9 MMOL/L — SIGNIFICANT CHANGE UP (ref 3.5–5.3)
POTASSIUM SERPL-SCNC: 3.9 MMOL/L — SIGNIFICANT CHANGE UP (ref 3.5–5.3)
RBC # BLD: 4.43 M/UL — SIGNIFICANT CHANGE UP (ref 3.8–5.2)
RBC # FLD: 12.5 % — SIGNIFICANT CHANGE UP (ref 10.3–14.5)
SODIUM SERPL-SCNC: 139 MMOL/L — SIGNIFICANT CHANGE UP (ref 135–145)
WBC # BLD: 15.56 K/UL — HIGH (ref 3.8–10.5)
WBC # FLD AUTO: 15.56 K/UL — HIGH (ref 3.8–10.5)

## 2023-09-26 PROCEDURE — 74019 RADEX ABDOMEN 2 VIEWS: CPT | Mod: 26

## 2023-09-26 RX ORDER — LEVOTHYROXINE SODIUM 125 MCG
1 TABLET ORAL
Refills: 0 | DISCHARGE

## 2023-09-26 RX ORDER — LEVOTHYROXINE SODIUM 125 MCG
150 TABLET ORAL AT BEDTIME
Refills: 0 | Status: DISCONTINUED | OUTPATIENT
Start: 2023-09-26 | End: 2023-09-26

## 2023-09-26 RX ORDER — LEVOTHYROXINE SODIUM 125 MCG
120 TABLET ORAL AT BEDTIME
Refills: 0 | Status: DISCONTINUED | OUTPATIENT
Start: 2023-09-26 | End: 2023-09-26

## 2023-09-26 RX ORDER — MULTIVIT-MIN/FERROUS GLUCONATE 9 MG/15 ML
1 LIQUID (ML) ORAL DAILY
Refills: 0 | Status: DISCONTINUED | OUTPATIENT
Start: 2023-09-26 | End: 2023-09-26

## 2023-09-26 RX ORDER — KETOROLAC TROMETHAMINE 30 MG/ML
15 SYRINGE (ML) INJECTION ONCE
Refills: 0 | Status: DISCONTINUED | OUTPATIENT
Start: 2023-09-26 | End: 2023-09-26

## 2023-09-26 RX ORDER — CALCIUM CARBONATE 500(1250)
1 TABLET ORAL
Qty: 0 | Refills: 0 | DISCHARGE

## 2023-09-26 RX ORDER — MULTIVIT-MIN/FERROUS GLUCONATE 9 MG/15 ML
1 LIQUID (ML) ORAL
Qty: 0 | Refills: 0 | DISCHARGE

## 2023-09-26 RX ORDER — LEVOTHYROXINE SODIUM 125 MCG
1 TABLET ORAL
Qty: 0 | Refills: 0 | DISCHARGE

## 2023-09-26 RX ORDER — INFLUENZA VIRUS VACCINE 15; 15; 15; 15 UG/.5ML; UG/.5ML; UG/.5ML; UG/.5ML
0.5 SUSPENSION INTRAMUSCULAR ONCE
Refills: 0 | Status: DISCONTINUED | OUTPATIENT
Start: 2023-09-26 | End: 2023-09-27

## 2023-09-26 RX ORDER — LEVOTHYROXINE SODIUM 125 MCG
120 TABLET ORAL AT BEDTIME
Refills: 0 | Status: DISCONTINUED | OUTPATIENT
Start: 2023-09-26 | End: 2023-09-27

## 2023-09-26 RX ADMIN — Medication 15 MILLIGRAM(S): at 00:21

## 2023-09-26 RX ADMIN — Medication 15 MILLIGRAM(S): at 00:51

## 2023-09-26 RX ADMIN — Medication 15 MILLIGRAM(S): at 09:00

## 2023-09-26 RX ADMIN — Medication 120 MICROGRAM(S): at 22:18

## 2023-09-26 RX ADMIN — Medication 15 MILLIGRAM(S): at 08:05

## 2023-09-26 NOTE — DISCHARGE NOTE PROVIDER - HOSPITAL COURSE
HPI: 49 y/o female w/ PMH hypothyroidism, SBO & PSH myomectomy x3, partial hysterectomy, gastric band (2007), gastric sleeve (2018) p/w abdominal pain x1 day. Pt reports onset of pain last night which awoke her from sleep. Pt endorses similar pain from first episode of SBO (04/2022), generalized over her abdomen and 9/10 in severity. Pt denies vomiting, endorses mild nausea at present. Endorses occasional flatus.  Denies subjective fever, chills, Vomiting, change in urinary habits, BRBPR, melena or LOC.    Hospital Course:  Pt had a CT while in the ED that revealed "High-grade small bowel obstruction with abrupt transition point in the left para midline lower abdomen (series 2:85). Mild associated mesenteric edema and small volume ascites."  Pt was managed conservatively, kept NPO, serial abd exams & radiographs.    Diet was advanced appropriately until return of normal GI function was obtained as evidence by passing of flatus and BM in addition to toleration of diet.  Lab values were monitored with resolution of elevated Wc, electrolytes were repleted as indicated.    Dispo: discharge home, to follow up with Dr. Montesinos outpatient in 1 week.

## 2023-09-26 NOTE — CARE COORDINATION ASSESSMENT. - NSPASTMEDSURGHISTORY_GEN_ALL_CORE_FT
PAST MEDICAL & SURGICAL HISTORY:  Heart Murmur  Patient state she was told she no longer has this      Hypothyroidism       Section 2006      GERD (gastroesophageal reflux disease)      Morbid obesity  resolved with weight loss surgery      H/O abdominoplasty        H/O: hysterectomy  2014 fibroids      H/O myomectomy  2001      Admission for adjustment of gastric lap band  2007 removal 2018      H/O bilateral breast reduction surgery  with breast implants  silicone      H/O spinal fusion  10/2017      S/P laparoscopic sleeve gastrectomy  2018

## 2023-09-26 NOTE — PATIENT PROFILE ADULT - FALL HARM RISK - UNIVERSAL INTERVENTIONS
Bed in lowest position, wheels locked, appropriate side rails in place/Call bell, personal items and telephone in reach/Instruct patient to call for assistance before getting out of bed or chair/Non-slip footwear when patient is out of bed/[place] to call system/Physically safe environment - no spills, clutter or unnecessary equipment/Purposeful Proactive Rounding/Room/bathroom lighting operational, light cord in reach

## 2023-09-26 NOTE — DISCHARGE NOTE PROVIDER - CARE PROVIDER_API CALL
Danny Montesinos [place]  Surgery  [address] [place] [address]  Phone: [phone]  Fax: [phone]  Follow Up Time:

## 2023-09-26 NOTE — DISCHARGE NOTE PROVIDER - NSDCMRMEDTOKEN_GEN_ALL_CORE_FT
One A Day Women&#x27;s Complete: 1 tab(s) chewed once a day  Synthroid 200 mcg (0.2 mg) oral tablet: 1 tab(s) orally once a day

## 2023-09-26 NOTE — CARE COORDINATION ASSESSMENT. - OTHER PERTINENT REFERRAL INFORMATION
Met with the patient at the bedside. Explained the role of case management/discharge planning. Patient verbalized understanding. Provided contact information and discharge planning packet. Patient resides with her spouse and was independent PTA. Patient admitted with a SBO. No anticipated skilled needs noted at the present time. Will remain available to patient and family throughout hospital stay

## 2023-09-26 NOTE — CARE COORDINATION ASSESSMENT. - NSCAREPROVIDERS_GEN_ALL_CORE_FT
CARE PROVIDERS:  Accepting Physician: Danny Montesinos  Administration: Kamla Quinn  Admitting: Danny Montseinos  Attending: Danny Montesinos  Case Management: Ramonita Crenshaw  Case Management: Stefanie May  Case Management: Maurilio Higuera  Consultant: Weil, Patricia  Consultant: Tyler Bell  Consultant: pM Brito  ED ACP: Carson Andrea  ED Attending: Naseem Mojica ED Nurse: Clementine Guallpa  Nurse: Allyson Marroquin  Nurse: Gracie Doll  Nurse: Jeannie Izaguirre  Nurse: Kamla Baldwin  Nurse: Shirley Meeks  Ordered: ADM, User  Ordered: Bryce Jaquez  Override: Jeannie Izaguirre  Override: Gracie Doll  PCA/Nursing Assistant: Emily Samuel  PCA/Nursing Assistant: Marilu Whitten  Physical Therapy: Daphnie Salmeron  Primary Team: Carson Andrea  Primary Team: Danny Montesinos  Primary Team: Bryce Jaquez  Primary Team: Josesito Kinsey  Registered Dietitian: Georgina Cruz  : Allyssa Sierra// Supp. Assoc.: Lilliana Meza

## 2023-09-27 ENCOUNTER — TRANSCRIPTION ENCOUNTER (OUTPATIENT)
Age: 48
End: 2023-09-27

## 2023-09-27 VITALS
DIASTOLIC BLOOD PRESSURE: 72 MMHG | RESPIRATION RATE: 17 BRPM | TEMPERATURE: 98 F | HEART RATE: 79 BPM | OXYGEN SATURATION: 96 % | SYSTOLIC BLOOD PRESSURE: 109 MMHG

## 2023-09-27 LAB
ANION GAP SERPL CALC-SCNC: 3 MMOL/L — LOW (ref 5–17)
BASOPHILS # BLD AUTO: 0.04 K/UL — SIGNIFICANT CHANGE UP (ref 0–0.2)
BASOPHILS NFR BLD AUTO: 0.6 % — SIGNIFICANT CHANGE UP (ref 0–2)
BUN SERPL-MCNC: 11 MG/DL — SIGNIFICANT CHANGE UP (ref 7–23)
CALCIUM SERPL-MCNC: 8.1 MG/DL — LOW (ref 8.5–10.1)
CHLORIDE SERPL-SCNC: 112 MMOL/L — HIGH (ref 96–108)
CO2 SERPL-SCNC: 27 MMOL/L — SIGNIFICANT CHANGE UP (ref 22–31)
CREAT SERPL-MCNC: 0.6 MG/DL — SIGNIFICANT CHANGE UP (ref 0.5–1.3)
EGFR: 111 ML/MIN/1.73M2 — SIGNIFICANT CHANGE UP
EOSINOPHIL # BLD AUTO: 0.19 K/UL — SIGNIFICANT CHANGE UP (ref 0–0.5)
EOSINOPHIL NFR BLD AUTO: 2.7 % — SIGNIFICANT CHANGE UP (ref 0–6)
GLUCOSE SERPL-MCNC: 89 MG/DL — SIGNIFICANT CHANGE UP (ref 70–99)
HCT VFR BLD CALC: 33.7 % — LOW (ref 34.5–45)
HGB BLD-MCNC: 11.1 G/DL — LOW (ref 11.5–15.5)
IMM GRANULOCYTES NFR BLD AUTO: 0.6 % — SIGNIFICANT CHANGE UP (ref 0–0.9)
LYMPHOCYTES # BLD AUTO: 2.17 K/UL — SIGNIFICANT CHANGE UP (ref 1–3.3)
LYMPHOCYTES # BLD AUTO: 31.1 % — SIGNIFICANT CHANGE UP (ref 13–44)
MCHC RBC-ENTMCNC: 29.1 PG — SIGNIFICANT CHANGE UP (ref 27–34)
MCHC RBC-ENTMCNC: 32.9 GM/DL — SIGNIFICANT CHANGE UP (ref 32–36)
MCV RBC AUTO: 88.2 FL — SIGNIFICANT CHANGE UP (ref 80–100)
MONOCYTES # BLD AUTO: 0.65 K/UL — SIGNIFICANT CHANGE UP (ref 0–0.9)
MONOCYTES NFR BLD AUTO: 9.3 % — SIGNIFICANT CHANGE UP (ref 2–14)
NEUTROPHILS # BLD AUTO: 3.88 K/UL — SIGNIFICANT CHANGE UP (ref 1.8–7.4)
NEUTROPHILS NFR BLD AUTO: 55.7 % — SIGNIFICANT CHANGE UP (ref 43–77)
NRBC # BLD: 0 /100 WBCS — SIGNIFICANT CHANGE UP (ref 0–0)
PLATELET # BLD AUTO: 209 K/UL — SIGNIFICANT CHANGE UP (ref 150–400)
POTASSIUM SERPL-MCNC: 4.5 MMOL/L — SIGNIFICANT CHANGE UP (ref 3.5–5.3)
POTASSIUM SERPL-SCNC: 4.5 MMOL/L — SIGNIFICANT CHANGE UP (ref 3.5–5.3)
RBC # BLD: 3.82 M/UL — SIGNIFICANT CHANGE UP (ref 3.8–5.2)
RBC # FLD: 12.7 % — SIGNIFICANT CHANGE UP (ref 10.3–14.5)
SODIUM SERPL-SCNC: 142 MMOL/L — SIGNIFICANT CHANGE UP (ref 135–145)
WBC # BLD: 6.97 K/UL — SIGNIFICANT CHANGE UP (ref 3.8–10.5)
WBC # FLD AUTO: 6.97 K/UL — SIGNIFICANT CHANGE UP (ref 3.8–10.5)

## 2023-09-27 PROCEDURE — 84100 ASSAY OF PHOSPHORUS: CPT

## 2023-09-27 PROCEDURE — 86900 BLOOD TYPING SEROLOGIC ABO: CPT

## 2023-09-27 PROCEDURE — 85730 THROMBOPLASTIN TIME PARTIAL: CPT

## 2023-09-27 PROCEDURE — 86901 BLOOD TYPING SEROLOGIC RH(D): CPT

## 2023-09-27 PROCEDURE — 99285 EMERGENCY DEPT VISIT HI MDM: CPT

## 2023-09-27 PROCEDURE — 80053 COMPREHEN METABOLIC PANEL: CPT

## 2023-09-27 PROCEDURE — 83605 ASSAY OF LACTIC ACID: CPT

## 2023-09-27 PROCEDURE — 86850 RBC ANTIBODY SCREEN: CPT

## 2023-09-27 PROCEDURE — 36415 COLL VENOUS BLD VENIPUNCTURE: CPT

## 2023-09-27 PROCEDURE — 74177 CT ABD & PELVIS W/CONTRAST: CPT | Mod: MA

## 2023-09-27 PROCEDURE — 93005 ELECTROCARDIOGRAM TRACING: CPT

## 2023-09-27 PROCEDURE — 83735 ASSAY OF MAGNESIUM: CPT

## 2023-09-27 PROCEDURE — 74019 RADEX ABDOMEN 2 VIEWS: CPT

## 2023-09-27 PROCEDURE — 74019 RADEX ABDOMEN 2 VIEWS: CPT | Mod: 26

## 2023-09-27 PROCEDURE — 85610 PROTHROMBIN TIME: CPT

## 2023-09-27 PROCEDURE — 87040 BLOOD CULTURE FOR BACTERIA: CPT

## 2023-09-27 PROCEDURE — 96374 THER/PROPH/DIAG INJ IV PUSH: CPT

## 2023-09-27 PROCEDURE — 85027 COMPLETE CBC AUTOMATED: CPT

## 2023-09-27 PROCEDURE — 85025 COMPLETE CBC W/AUTO DIFF WBC: CPT

## 2023-09-27 PROCEDURE — 87635 SARS-COV-2 COVID-19 AMP PRB: CPT

## 2023-09-27 PROCEDURE — 84702 CHORIONIC GONADOTROPIN TEST: CPT

## 2023-09-27 PROCEDURE — 83690 ASSAY OF LIPASE: CPT

## 2023-09-27 PROCEDURE — 80048 BASIC METABOLIC PNL TOTAL CA: CPT

## 2023-09-27 RX ORDER — ACETAMINOPHEN 500 MG
1000 TABLET ORAL EVERY 6 HOURS
Refills: 0 | Status: DISCONTINUED | OUTPATIENT
Start: 2023-09-27 | End: 2023-09-27

## 2023-09-27 RX ADMIN — Medication 1000 MILLIGRAM(S): at 05:58

## 2023-09-27 RX ADMIN — Medication 1000 MILLIGRAM(S): at 06:40

## 2023-09-27 NOTE — PROGRESS NOTE ADULT - SUBJECTIVE AND OBJECTIVE BOX
SUBJECTIVE:  Patient seen and examined at bedside.  Endorses significant improvement symptoms and adequate pain control.  Denies flatus since being admitted. Voiding, ambulating.  Patient denies any fever, chills, chest pain, shortness of breath, nausea, vomiting, or urinary complaints.    VITALS  Vital Signs Last 24 Hrs  T(C): 36.6 (26 Sep 2023 04:52), Max: 36.7 (25 Sep 2023 17:10)  T(F): 97.9 (26 Sep 2023 04:52), Max: 98.1 (25 Sep 2023 23:55)  HR: 69 (26 Sep 2023 04:52) (51 - 69)  BP: 116/70 (26 Sep 2023 04:52) (112/71 - 131/77)  BP(mean): --  RR: 18 (26 Sep 2023 04:52) (17 - 19)  SpO2: 95% (26 Sep 2023 04:52) (95% - 100%)    Parameters below as of 26 Sep 2023 00:08  Patient On (Oxygen Delivery Method): room air    PHYSICAL EXAM  GENERAL:  Well-developed Female lying comfortably in bed in NAD.  HEENT:  NC/AT. Sclera white. Mucous membranes moist.  ABDOMEN: Soft, distended, Lower abdominal TTP B/L; No rebound tenderness or guarding.  SKIN:  No jaundice, pallor, or cyanosis  NEURO:  A&O x 3    MEDICATIONS  MEDICATIONS  (STANDING):  influenza   Vaccine 0.5 milliLiter(s) IntraMuscular once  iohexol 300 mG (iodine)/mL Oral Solution 30 milliLiter(s) Oral once  levothyroxine Injectable 120 MICROGram(s) IV Push at bedtime    MEDICATIONS  (PRN):  acetaminophen   IVPB .. 1000 milliGRAM(s) IV Intermittent once PRN Mild Pain (1 - 3)  HYDROmorphone  Injectable 0.5 milliGRAM(s) IV Push every 6 hours PRN Severe Pain (7 - 10)  ondansetron Injectable 4 milliGRAM(s) IV Push every 6 hours PRN Nausea    LABS:                      14.8   16.71 )-----------( 302      ( 25 Sep 2023 18:30 )             45.0     09-25    138  |  105  |  23  ----------------------------<  125<H>  4.7   |  26  |  0.75    Ca    9.2      25 Sep 2023 18:30    TPro  8.1  /  Alb  3.8  /  TBili  0.8  /  DBili  x   /  AST  12<L>  /  ALT  27  /  AlkPhos  70  09-25    PT/INR - ( 25 Sep 2023 18:30 )   PT: 10.3 sec;   INR: 0.88 ratio    PTT - ( 25 Sep 2023 18:30 )  PTT:29.1 sec    ASSESSMENT & PLAN  47 y/o female w/ PMH hypothyroidism, SBO & PSH myomectomy x3, partial hysterectomy, gastric band (2007), gastric sleeve (2018) a/w SBO.  Pt endorses adequate pain control, somewhat comfortable at present.  AFVSS, AM labs pending.  Persistently distended and tender lower abdomen on exam, remains soft.    - NPO  - REAGAN  - 2 view AXR, f/u  - OOB stressed to pt  - pain control, supportive care  - Follow up AM labs
SUBJECTIVE:  Patient seen and examined at bedside. Pt endorses significant improvement in abdominal pain when compared to admission. Admits to flatus and BM yesterday. Voiding, ambulating and tolerating full liquid diet.  Pt does endorse headache this AM.  Patient denies any fever, chills, chest pain, shortness of breath, nausea, vomiting, or urinary complaints.    VITALS  Vital Signs Last 24 Hrs  T(C): 36.9 (27 Sep 2023 04:55), Max: 37.1 (26 Sep 2023 13:20)  T(F): 98.4 (27 Sep 2023 04:55), Max: 98.8 (26 Sep 2023 20:35)  HR: 73 (27 Sep 2023 04:55) (62 - 84)  BP: 98/62 (27 Sep 2023 04:55) (98/56 - 109/69)  BP(mean): --  RR: 18 (27 Sep 2023 04:55) (18 - 19)  SpO2: 94% (27 Sep 2023 04:55) (94% - 98%)    Parameters below as of 27 Sep 2023 00:01  Patient On (Oxygen Delivery Method): room air    PHYSICAL EXAM  GENERAL:  Well-developed Female lying comfortably in bed in NAD.  HEENT:  NC/AT. Sclera white. Mucous membranes moist.  ABDOMEN: Soft, mildly distended, persistent lower abdominal TTP B/L; No rebound tenderness or guarding.  SKIN:  No jaundice, pallor, or cyanosis  NEURO:  A&O x 3    MEDICATIONS  MEDICATIONS  (STANDING):  influenza   Vaccine 0.5 milliLiter(s) IntraMuscular once  iohexol 300 mG (iodine)/mL Oral Solution 30 milliLiter(s) Oral once  levothyroxine Injectable 120 MICROGram(s) IV Push at bedtime    MEDICATIONS  (PRN):  acetaminophen   IVPB .. 1000 milliGRAM(s) IV Intermittent once PRN Mild Pain (1 - 3)  HYDROmorphone  Injectable 0.5 milliGRAM(s) IV Push every 6 hours PRN Severe Pain (7 - 10)  ondansetron Injectable 4 milliGRAM(s) IV Push every 6 hours PRN Nausea    LABS:                      12.8   15.56 )-----------( 260      ( 26 Sep 2023 06:44 )             38.1     09-26    139  |  109<H>  |  23  ----------------------------<  103<H>  3.9   |  23  |  0.64    Ca    8.0<L>      26 Sep 2023 06:44  Phos  3.7     09-26  Mg     2.5     09-26    TPro  8.1  /  Alb  3.8  /  TBili  0.8  /  DBili  x   /  AST  12<L>  /  ALT  27  /  AlkPhos  70  09-25    PT/INR - ( 25 Sep 2023 18:30 )   PT: 10.3 sec;   INR: 0.88 ratio    PTT - ( 25 Sep 2023 18:30 )  PTT:29.1 sec    Culture - Blood (collected 25 Sep 2023 21:15)  Source: .Blood Blood-Peripheral  Preliminary Report (27 Sep 2023 01:02):    No growth at 24 hours    Culture - Blood (collected 25 Sep 2023 21:00)  Source: .Blood Blood-Peripheral  Preliminary Report (27 Sep 2023 01:02):    No growth at 24 hours    ASSESSMENT & PLAN   47 y/o female w/ PMH hypothyroidism, SBO & PSH myomectomy x3, partial hysterectomy, gastric band (2007), gastric sleeve (2018) a/w SBO.  Pt endorses adequate pain control, comfortable at present.  Afebrile, BP somewhat soft this AM. AM labs pending.  Persistently distended, somewhat improved compared to yesterday. Tender lower abdomen on exam, remains soft.    - Maintain liquid diet this AM; f/u tolerance  - REAGAN  - 2 view AXR 09/27 AM wet read reveals persistently dilated loops of small bowel w/ air-fluid levels.  - OOB stressed to pt  - pain control for headache, supportive care  - Follow up AM labs

## 2023-09-27 NOTE — DISCHARGE NOTE NURSING/CASE MANAGEMENT/SOCIAL WORK - NSDCPEFALRISK_GEN_ALL_CORE
For information on Fall & Injury Prevention, visit: [email or web address] OR  [email or web address] OR  https://www.cdc.gov/steadi/patient.html

## 2023-09-27 NOTE — DISCHARGE NOTE NURSING/CASE MANAGEMENT/SOCIAL WORK - PATIENT PORTAL LINK FT
You can access the FollowMyHealth Patient Portal offered by [facility] by registering at the following website: [email or web address]. By joining [employer]’s FollowMyHealth portal, you will also be able to view your health information using other applications (apps) compatible with our system.

## 2023-09-28 ENCOUNTER — INPATIENT (INPATIENT)
Facility: HOSPITAL | Age: 48
LOS: 3 days | Discharge: HOME | DRG: 390 | End: 2023-10-02
Attending: SURGERY | Admitting: SURGERY
Payer: MEDICARE

## 2023-09-28 VITALS
WEIGHT: 132.28 LBS | HEART RATE: 60 BPM | SYSTOLIC BLOOD PRESSURE: 127 MMHG | RESPIRATION RATE: 16 BRPM | OXYGEN SATURATION: 100 % | DIASTOLIC BLOOD PRESSURE: 84 MMHG | HEIGHT: 67 IN | TEMPERATURE: 97 F

## 2023-09-28 DIAGNOSIS — Z98.84 BARIATRIC SURGERY STATUS: Chronic | ICD-10-CM

## 2023-09-28 DIAGNOSIS — Z98.890 OTHER SPECIFIED POSTPROCEDURAL STATES: Chronic | ICD-10-CM

## 2023-09-28 DIAGNOSIS — Z90.710 ACQUIRED ABSENCE OF BOTH CERVIX AND UTERUS: Chronic | ICD-10-CM

## 2023-09-28 DIAGNOSIS — Z98.1 ARTHRODESIS STATUS: Chronic | ICD-10-CM

## 2023-09-28 LAB
ALBUMIN SERPL ELPH-MCNC: 3.7 G/DL — SIGNIFICANT CHANGE UP (ref 3.3–5)
ALP SERPL-CCNC: 63 U/L — SIGNIFICANT CHANGE UP (ref 40–120)
ALT FLD-CCNC: 22 U/L — SIGNIFICANT CHANGE UP (ref 12–78)
ANION GAP SERPL CALC-SCNC: 11 MMOL/L — SIGNIFICANT CHANGE UP (ref 5–17)
APTT BLD: 31.3 SEC — SIGNIFICANT CHANGE UP (ref 24.5–35.6)
AST SERPL-CCNC: 13 U/L — LOW (ref 15–37)
BASOPHILS # BLD AUTO: 0.04 K/UL — SIGNIFICANT CHANGE UP (ref 0–0.2)
BASOPHILS NFR BLD AUTO: 0.3 % — SIGNIFICANT CHANGE UP (ref 0–2)
BILIRUB SERPL-MCNC: 0.5 MG/DL — SIGNIFICANT CHANGE UP (ref 0.2–1.2)
BUN SERPL-MCNC: 16 MG/DL — SIGNIFICANT CHANGE UP (ref 7–23)
CALCIUM SERPL-MCNC: 9.1 MG/DL — SIGNIFICANT CHANGE UP (ref 8.5–10.1)
CHLORIDE SERPL-SCNC: 102 MMOL/L — SIGNIFICANT CHANGE UP (ref 96–108)
CO2 SERPL-SCNC: 25 MMOL/L — SIGNIFICANT CHANGE UP (ref 22–31)
CREAT SERPL-MCNC: 0.74 MG/DL — SIGNIFICANT CHANGE UP (ref 0.5–1.3)
EGFR: 100 ML/MIN/1.73M2 — SIGNIFICANT CHANGE UP
EOSINOPHIL # BLD AUTO: 0.02 K/UL — SIGNIFICANT CHANGE UP (ref 0–0.5)
EOSINOPHIL NFR BLD AUTO: 0.1 % — SIGNIFICANT CHANGE UP (ref 0–6)
GLUCOSE SERPL-MCNC: 129 MG/DL — HIGH (ref 70–99)
HCG SERPL-ACNC: <1 MIU/ML — SIGNIFICANT CHANGE UP
HCT VFR BLD CALC: 39.1 % — SIGNIFICANT CHANGE UP (ref 34.5–45)
HGB BLD-MCNC: 13.3 G/DL — SIGNIFICANT CHANGE UP (ref 11.5–15.5)
IMM GRANULOCYTES NFR BLD AUTO: 0.5 % — SIGNIFICANT CHANGE UP (ref 0–0.9)
INR BLD: 0.89 RATIO — SIGNIFICANT CHANGE UP (ref 0.85–1.18)
LACTATE SERPL-SCNC: 1.1 MMOL/L — SIGNIFICANT CHANGE UP (ref 0.7–2)
LYMPHOCYTES # BLD AUTO: 0.98 K/UL — LOW (ref 1–3.3)
LYMPHOCYTES # BLD AUTO: 6.6 % — LOW (ref 13–44)
MAGNESIUM SERPL-MCNC: 2 MG/DL — SIGNIFICANT CHANGE UP (ref 1.6–2.6)
MCHC RBC-ENTMCNC: 28.9 PG — SIGNIFICANT CHANGE UP (ref 27–34)
MCHC RBC-ENTMCNC: 34 GM/DL — SIGNIFICANT CHANGE UP (ref 32–36)
MCV RBC AUTO: 85 FL — SIGNIFICANT CHANGE UP (ref 80–100)
MONOCYTES # BLD AUTO: 0.54 K/UL — SIGNIFICANT CHANGE UP (ref 0–0.9)
MONOCYTES NFR BLD AUTO: 3.7 % — SIGNIFICANT CHANGE UP (ref 2–14)
NEUTROPHILS # BLD AUTO: 13.11 K/UL — HIGH (ref 1.8–7.4)
NEUTROPHILS NFR BLD AUTO: 88.8 % — HIGH (ref 43–77)
NRBC # BLD: 0 /100 WBCS — SIGNIFICANT CHANGE UP (ref 0–0)
PHOSPHATE SERPL-MCNC: 3.2 MG/DL — SIGNIFICANT CHANGE UP (ref 2.5–4.5)
PLATELET # BLD AUTO: 276 K/UL — SIGNIFICANT CHANGE UP (ref 150–400)
POTASSIUM SERPL-MCNC: 4.4 MMOL/L — SIGNIFICANT CHANGE UP (ref 3.5–5.3)
POTASSIUM SERPL-SCNC: 4.4 MMOL/L — SIGNIFICANT CHANGE UP (ref 3.5–5.3)
PROT SERPL-MCNC: 7.5 G/DL — SIGNIFICANT CHANGE UP (ref 6–8.3)
PROTHROM AB SERPL-ACNC: 10.4 SEC — SIGNIFICANT CHANGE UP (ref 9.5–13)
RBC # BLD: 4.6 M/UL — SIGNIFICANT CHANGE UP (ref 3.8–5.2)
RBC # FLD: 12.3 % — SIGNIFICANT CHANGE UP (ref 10.3–14.5)
SODIUM SERPL-SCNC: 138 MMOL/L — SIGNIFICANT CHANGE UP (ref 135–145)
WBC # BLD: 14.76 K/UL — HIGH (ref 3.8–10.5)
WBC # FLD AUTO: 14.76 K/UL — HIGH (ref 3.8–10.5)

## 2023-09-28 PROCEDURE — 99285 EMERGENCY DEPT VISIT HI MDM: CPT

## 2023-09-28 RX ORDER — SODIUM CHLORIDE 9 MG/ML
1000 INJECTION INTRAMUSCULAR; INTRAVENOUS; SUBCUTANEOUS ONCE
Refills: 0 | Status: COMPLETED | OUTPATIENT
Start: 2023-09-28 | End: 2023-09-28

## 2023-09-28 RX ORDER — ONDANSETRON 8 MG/1
4 TABLET, FILM COATED ORAL ONCE
Refills: 0 | Status: COMPLETED | OUTPATIENT
Start: 2023-09-28 | End: 2023-09-28

## 2023-09-28 RX ORDER — MORPHINE SULFATE 50 MG/1
4 CAPSULE, EXTENDED RELEASE ORAL ONCE
Refills: 0 | Status: DISCONTINUED | OUTPATIENT
Start: 2023-09-28 | End: 2023-09-28

## 2023-09-28 RX ORDER — KETOROLAC TROMETHAMINE 30 MG/ML
15 SYRINGE (ML) INJECTION ONCE
Refills: 0 | Status: DISCONTINUED | OUTPATIENT
Start: 2023-09-28 | End: 2023-09-28

## 2023-09-28 RX ORDER — IOHEXOL 300 MG/ML
30 INJECTION, SOLUTION INTRAVENOUS ONCE
Refills: 0 | Status: COMPLETED | OUTPATIENT
Start: 2023-09-28 | End: 2023-09-28

## 2023-09-28 RX ADMIN — Medication 15 MILLIGRAM(S): at 22:27

## 2023-09-28 RX ADMIN — IOHEXOL 30 MILLILITER(S): 300 INJECTION, SOLUTION INTRAVENOUS at 22:27

## 2023-09-28 RX ADMIN — MORPHINE SULFATE 4 MILLIGRAM(S): 50 CAPSULE, EXTENDED RELEASE ORAL at 22:45

## 2023-09-28 RX ADMIN — ONDANSETRON 4 MILLIGRAM(S): 8 TABLET, FILM COATED ORAL at 22:26

## 2023-09-28 RX ADMIN — MORPHINE SULFATE 4 MILLIGRAM(S): 50 CAPSULE, EXTENDED RELEASE ORAL at 23:50

## 2023-09-28 RX ADMIN — MORPHINE SULFATE 4 MILLIGRAM(S): 50 CAPSULE, EXTENDED RELEASE ORAL at 22:27

## 2023-09-28 RX ADMIN — Medication 15 MILLIGRAM(S): at 22:45

## 2023-09-28 RX ADMIN — SODIUM CHLORIDE 1000 MILLILITER(S): 9 INJECTION INTRAMUSCULAR; INTRAVENOUS; SUBCUTANEOUS at 22:26

## 2023-09-28 NOTE — ED PROVIDER NOTE - CLINICAL SUMMARY MEDICAL DECISION MAKING FREE TEXT BOX
47 y/o female w/ PMH Hypothyroidism, SBO & PSH myomectomy x3, partial hysterectomy, gastric band (2007), gastric sleeve (2018) discharged on 9/27 with SBO presenting with abdominal pain/nausea/vomiting.  r/o recurrent SBO  Check screening labs, CT a/p  IVF, antiemetics   Surgery evaluation   Disposition pending imaging

## 2023-09-28 NOTE — ED PROVIDER NOTE - OBJECTIVE STATEMENT
47 y/o female w/ PMH hypothyroidism, SBO & PSH myomectomy x3, partial hysterectomy, gastric band (2007), gastric sleeve (2018) discharged on 9/27 with SBO presenting with abdominal pain/nausea/vomiting.    Discharged 9/27 after return of bowel function (last BM this morning). Reports that she went out to dinner and subsequently experienced two episodes of clear emesis with diffuse abdominal pain that started thereafter. No fevers/chills. 47 y/o female w/ PMH Hypothyroidism, SBO & PSH myomectomy x3, partial hysterectomy, gastric band (2007), gastric sleeve (2018) discharged on 9/27 with SBO presenting with abdominal pain/nausea/vomiting.    Discharged 9/27 after return of bowel function (last BM this morning). Reports that she went out to dinner and subsequently experienced two episodes of clear emesis with diffuse abdominal pain that started thereafter. No fevers/chills.

## 2023-09-28 NOTE — ED ADULT NURSE NOTE - CHPI ED NUR DURATION
Final Anesthesia Post-op Assessment    Patient: Estrella Herrera  Procedure(s) Performed: ESOPHAGOGASTRODUODENOSCOPY  Anesthesia type: Monitor Anesthesia Care    Vital Last Value   Temperature 36.4 Â°C (97.6 Â°F) (04/11/18 0937)   Pulse 72 (04/11/18 1304)   Respiratory Rate 16 (04/11/18 1304)   Non-Invasive   Blood Pressure 124/70 (04/11/18 1304)   Arterial  Blood Pressure     Pulse Oximetry 98 % (04/11/18 1304)     Last 24 I/O:   Intake/Output Summary (Last 24 hours) at 04/11/18 1335  Last data filed at 04/11/18 1226   Gross per 24 hour   Intake              425 ml   Output                0 ml   Net              425 ml       PATIENT LOCATION: Phase II  POST-OP VITAL SIGNS: stable  LEVEL OF CONSCIOUSNESS: participates in exam, answers questions appropriately, awake, alert and oriented  RESPIRATORY STATUS: spontaneous ventilation  CARDIOVASCULAR: blood pressure returned to baseline and stable  HYDRATION: euvolemic    PAIN MANAGEMENT: adequately controlled  NAUSEA: None  AIRWAY PATENCY: patent  POST-OP ASSESSMENT: no complications, patient tolerated procedure well with no complications and sufficiently recovered from acute administration of anesthesia effects and able to participate in evaluation  COMPLICATIONS: none
today

## 2023-09-28 NOTE — ED PROVIDER NOTE - PHYSICAL EXAMINATION
GENERAL: mildly uncomfortable appearing   HEAD:  Atraumatic, Normocephalic  EYES: EOMI, PERRLA, conjunctiva and sclera clear  ENT: MMM; oropharynx clear  NECK: Supple, No JVD  CHEST/LUNG: Clear to auscultation bilaterally; No wheeze  HEART: Regular rate and rhythm; No murmurs, rubs, or gallops  ABDOMEN: Soft, +diffuse tenderness Nondistended; Bowel sounds present  EXTREMITIES:  2+ Peripheral Pulses, No clubbing, cyanosis, or edema  PSYCH: AAOx3  NEUROLOGY: no focal motor or sensory deficits. 5/5 muscle strength in all extremities.   SKIN: No rashes or lesions

## 2023-09-28 NOTE — ED ADULT NURSE NOTE - OBJECTIVE STATEMENT
pt recently admitted and discharged for SBO.  pt reports feeling well up until dinner this evening.  after dinner pt began feeling pain, nauseous and vomiting about 3x prior to coming to ED.  abdomen distended and hypoactive bowel sounds noted.  LBM yesterday normal as per pt.

## 2023-09-29 ENCOUNTER — TRANSCRIPTION ENCOUNTER (OUTPATIENT)
Age: 48
End: 2023-09-29

## 2023-09-29 DIAGNOSIS — Z29.9 ENCOUNTER FOR PROPHYLACTIC MEASURES, UNSPECIFIED: ICD-10-CM

## 2023-09-29 DIAGNOSIS — K56.609 UNSPECIFIED INTESTINAL OBSTRUCTION, UNSPECIFIED AS TO PARTIAL VERSUS COMPLETE OBSTRUCTION: ICD-10-CM

## 2023-09-29 DIAGNOSIS — E03.9 HYPOTHYROIDISM, UNSPECIFIED: ICD-10-CM

## 2023-09-29 LAB
ANION GAP SERPL CALC-SCNC: 9 MMOL/L — SIGNIFICANT CHANGE UP (ref 5–17)
BASOPHILS # BLD AUTO: 0.05 K/UL — SIGNIFICANT CHANGE UP (ref 0–0.2)
BASOPHILS NFR BLD AUTO: 0.4 % — SIGNIFICANT CHANGE UP (ref 0–2)
BUN SERPL-MCNC: 14 MG/DL — SIGNIFICANT CHANGE UP (ref 7–23)
CALCIUM SERPL-MCNC: 8.3 MG/DL — LOW (ref 8.5–10.1)
CHLORIDE SERPL-SCNC: 107 MMOL/L — SIGNIFICANT CHANGE UP (ref 96–108)
CO2 SERPL-SCNC: 22 MMOL/L — SIGNIFICANT CHANGE UP (ref 22–31)
CREAT SERPL-MCNC: 0.62 MG/DL — SIGNIFICANT CHANGE UP (ref 0.5–1.3)
EGFR: 110 ML/MIN/1.73M2 — SIGNIFICANT CHANGE UP
EOSINOPHIL # BLD AUTO: 0.05 K/UL — SIGNIFICANT CHANGE UP (ref 0–0.5)
EOSINOPHIL NFR BLD AUTO: 0.4 % — SIGNIFICANT CHANGE UP (ref 0–6)
GLUCOSE SERPL-MCNC: 97 MG/DL — SIGNIFICANT CHANGE UP (ref 70–99)
HCT VFR BLD CALC: 36 % — SIGNIFICANT CHANGE UP (ref 34.5–45)
HGB BLD-MCNC: 12.2 G/DL — SIGNIFICANT CHANGE UP (ref 11.5–15.5)
IMM GRANULOCYTES NFR BLD AUTO: 0.3 % — SIGNIFICANT CHANGE UP (ref 0–0.9)
LACTATE SERPL-SCNC: 0.6 MMOL/L — LOW (ref 0.7–2)
LYMPHOCYTES # BLD AUTO: 17.5 % — SIGNIFICANT CHANGE UP (ref 13–44)
LYMPHOCYTES # BLD AUTO: 2.02 K/UL — SIGNIFICANT CHANGE UP (ref 1–3.3)
MCHC RBC-ENTMCNC: 28.6 PG — SIGNIFICANT CHANGE UP (ref 27–34)
MCHC RBC-ENTMCNC: 33.9 GM/DL — SIGNIFICANT CHANGE UP (ref 32–36)
MCV RBC AUTO: 84.3 FL — SIGNIFICANT CHANGE UP (ref 80–100)
MONOCYTES # BLD AUTO: 0.9 K/UL — SIGNIFICANT CHANGE UP (ref 0–0.9)
MONOCYTES NFR BLD AUTO: 7.8 % — SIGNIFICANT CHANGE UP (ref 2–14)
NEUTROPHILS # BLD AUTO: 8.5 K/UL — HIGH (ref 1.8–7.4)
NEUTROPHILS NFR BLD AUTO: 73.6 % — SIGNIFICANT CHANGE UP (ref 43–77)
NRBC # BLD: 0 /100 WBCS — SIGNIFICANT CHANGE UP (ref 0–0)
PLATELET # BLD AUTO: 271 K/UL — SIGNIFICANT CHANGE UP (ref 150–400)
POTASSIUM SERPL-MCNC: 3.9 MMOL/L — SIGNIFICANT CHANGE UP (ref 3.5–5.3)
POTASSIUM SERPL-SCNC: 3.9 MMOL/L — SIGNIFICANT CHANGE UP (ref 3.5–5.3)
RBC # BLD: 4.27 M/UL — SIGNIFICANT CHANGE UP (ref 3.8–5.2)
RBC # FLD: 12.3 % — SIGNIFICANT CHANGE UP (ref 10.3–14.5)
SARS-COV-2 RNA SPEC QL NAA+PROBE: SIGNIFICANT CHANGE UP
SODIUM SERPL-SCNC: 138 MMOL/L — SIGNIFICANT CHANGE UP (ref 135–145)
WBC # BLD: 11.56 K/UL — HIGH (ref 3.8–10.5)
WBC # FLD AUTO: 11.56 K/UL — HIGH (ref 3.8–10.5)

## 2023-09-29 PROCEDURE — 74177 CT ABD & PELVIS W/CONTRAST: CPT | Mod: 26,MA

## 2023-09-29 PROCEDURE — 74018 RADEX ABDOMEN 1 VIEW: CPT | Mod: 26,59

## 2023-09-29 PROCEDURE — 74019 RADEX ABDOMEN 2 VIEWS: CPT | Mod: 26

## 2023-09-29 RX ORDER — ONDANSETRON 8 MG/1
4 TABLET, FILM COATED ORAL EVERY 6 HOURS
Refills: 0 | Status: DISCONTINUED | OUTPATIENT
Start: 2023-09-29 | End: 2023-10-02

## 2023-09-29 RX ORDER — PANTOPRAZOLE SODIUM 20 MG/1
40 TABLET, DELAYED RELEASE ORAL DAILY
Refills: 0 | Status: DISCONTINUED | OUTPATIENT
Start: 2023-09-29 | End: 2023-10-02

## 2023-09-29 RX ORDER — LEVOTHYROXINE SODIUM 125 MCG
120 TABLET ORAL AT BEDTIME
Refills: 0 | Status: DISCONTINUED | OUTPATIENT
Start: 2023-09-29 | End: 2023-10-02

## 2023-09-29 RX ORDER — ONDANSETRON 8 MG/1
4 TABLET, FILM COATED ORAL ONCE
Refills: 0 | Status: COMPLETED | OUTPATIENT
Start: 2023-09-29 | End: 2023-09-29

## 2023-09-29 RX ORDER — ACETAMINOPHEN 500 MG
1000 TABLET ORAL EVERY 6 HOURS
Refills: 0 | Status: COMPLETED | OUTPATIENT
Start: 2023-09-29 | End: 2023-09-29

## 2023-09-29 RX ORDER — INFLUENZA VIRUS VACCINE 15; 15; 15; 15 UG/.5ML; UG/.5ML; UG/.5ML; UG/.5ML
0.5 SUSPENSION INTRAMUSCULAR ONCE
Refills: 0 | Status: DISCONTINUED | OUTPATIENT
Start: 2023-09-29 | End: 2023-10-02

## 2023-09-29 RX ORDER — KETOROLAC TROMETHAMINE 30 MG/ML
15 SYRINGE (ML) INJECTION EVERY 6 HOURS
Refills: 0 | Status: DISCONTINUED | OUTPATIENT
Start: 2023-09-29 | End: 2023-10-02

## 2023-09-29 RX ORDER — ACETAMINOPHEN 500 MG
1000 TABLET ORAL ONCE
Refills: 0 | Status: COMPLETED | OUTPATIENT
Start: 2023-09-29 | End: 2023-09-29

## 2023-09-29 RX ORDER — ACETAMINOPHEN 500 MG
1000 TABLET ORAL ONCE
Refills: 0 | Status: DISCONTINUED | OUTPATIENT
Start: 2023-09-29 | End: 2023-09-29

## 2023-09-29 RX ORDER — HYDROMORPHONE HYDROCHLORIDE 2 MG/ML
1 INJECTION INTRAMUSCULAR; INTRAVENOUS; SUBCUTANEOUS ONCE
Refills: 0 | Status: DISCONTINUED | OUTPATIENT
Start: 2023-09-29 | End: 2023-09-29

## 2023-09-29 RX ORDER — SODIUM CHLORIDE 9 MG/ML
1000 INJECTION, SOLUTION INTRAVENOUS
Refills: 0 | Status: DISCONTINUED | OUTPATIENT
Start: 2023-09-29 | End: 2023-10-02

## 2023-09-29 RX ADMIN — Medication 120 MICROGRAM(S): at 22:06

## 2023-09-29 RX ADMIN — MORPHINE SULFATE 4 MILLIGRAM(S): 50 CAPSULE, EXTENDED RELEASE ORAL at 00:10

## 2023-09-29 RX ADMIN — PANTOPRAZOLE SODIUM 40 MILLIGRAM(S): 20 TABLET, DELAYED RELEASE ORAL at 06:07

## 2023-09-29 RX ADMIN — Medication 15 MILLIGRAM(S): at 10:30

## 2023-09-29 RX ADMIN — ONDANSETRON 4 MILLIGRAM(S): 8 TABLET, FILM COATED ORAL at 02:01

## 2023-09-29 RX ADMIN — HYDROMORPHONE HYDROCHLORIDE 1 MILLIGRAM(S): 2 INJECTION INTRAMUSCULAR; INTRAVENOUS; SUBCUTANEOUS at 02:01

## 2023-09-29 RX ADMIN — Medication 15 MILLIGRAM(S): at 09:52

## 2023-09-29 RX ADMIN — Medication 400 MILLIGRAM(S): at 06:06

## 2023-09-29 RX ADMIN — HYDROMORPHONE HYDROCHLORIDE 1 MILLIGRAM(S): 2 INJECTION INTRAMUSCULAR; INTRAVENOUS; SUBCUTANEOUS at 02:14

## 2023-09-29 RX ADMIN — SODIUM CHLORIDE 100 MILLILITER(S): 9 INJECTION, SOLUTION INTRAVENOUS at 06:06

## 2023-09-29 RX ADMIN — Medication 1000 MILLIGRAM(S): at 17:48

## 2023-09-29 RX ADMIN — Medication 400 MILLIGRAM(S): at 16:58

## 2023-09-29 RX ADMIN — Medication 400 MILLIGRAM(S): at 03:08

## 2023-09-29 RX ADMIN — Medication 400 MILLIGRAM(S): at 22:06

## 2023-09-29 RX ADMIN — Medication 1000 MILLIGRAM(S): at 22:21

## 2023-09-29 RX ADMIN — Medication 1000 MILLIGRAM(S): at 06:21

## 2023-09-29 NOTE — H&P ADULT - NSICDXPASTMEDICALHX_GEN_ALL_CORE_FT
PAST MEDICAL HISTORY:  GERD (gastroesophageal reflux disease)     H/O laparoscopic adjustable gastric banding     Heart Murmur Patient state she was told she no longer has this    Hypothyroidism     Morbid obesity resolved with weight loss surgery    SBO (small bowel obstruction)

## 2023-09-29 NOTE — H&P ADULT - NSHPREVIEWOFSYSTEMS_GEN_ALL_CORE
Constitutional: Denies fever, fatigue or weight loss.  Skin: Denies rash.  Cardiovascular: Denies chest pain or palpation.  Respiratory: Denies cough, shortness of breath, congestion, or wheezing.  Gastrointestinal: SEE HPI  Genitourinary: Denies dysuria.  Musculoskeletal: Denies joint swelling.  Neurologic: Denies headache.

## 2023-09-29 NOTE — DISCHARGE NOTE PROVIDER - NSDCFUADDINST_GEN_ALL_CORE_FT
Please continue a full liquid diet for the next three days. You can advance your diet slowly as tolerated after.

## 2023-09-29 NOTE — CHART NOTE - NSCHARTNOTEFT_GEN_A_CORE
Alerted by ED MD, that patient is presenting at ED for n/v after previously being discharge 9/27/23 for SBO.    Patient s/e at bedside in ED. Patient states symptoms of abdominal pain, n/v started after eating lunch at an Italian restaurant at 1330hr this afternoon. Lunch consisted of meatballs, minestrone soup and potatoes. Patient states upon arriving at home, her abdominal pain started, and she experienced dry heaving, followed by vomiting clear liquid. Pt reports feeling bloated. Reports last BM this AM, and passing flatus until arriving home from lunch.    Exam:  Gen: patient awake and alert, NAD, lying comfortably in bed  Abd: soft, mildly distended, surgical scars present, ttp RLQ. +BS in all 4 quadrants    A/P:  Pt with very recent hospitalization 9/25-9/27 for SBO, treated conservatively, diet advanced as tolerated as exhibited by patient having bowel function and tolerating the PO intake.  - Will follow up BW  - Will follow up imaging studies  - Discussed with Dr. Montesinos.

## 2023-09-29 NOTE — H&P ADULT - NSICDXPASTSURGICALHX_GEN_ALL_CORE_FT
PAST SURGICAL HISTORY:   Section      H/O abdominoplasty     H/O bilateral breast reduction surgery with breast implants 2013 silicone    H/O myomectomy 2001    H/O spinal fusion 10/2017    H/O: hysterectomy 2014 fibroids    S/P laparoscopic sleeve gastrectomy 2018

## 2023-09-29 NOTE — DISCHARGE NOTE PROVIDER - CARE PROVIDER_API CALL
Danny Montesinos [facility]  [address] [place] [address]  Phone: [phone]  Fax: [phone]  Follow Up Time:

## 2023-09-29 NOTE — H&P ADULT - NSHPLABSRESULTS_GEN_ALL_CORE
13.3   14.76 )-----------( 276      ( 28 Sep 2023 22:30 )             39.1   09-28    138  |  102  |  16  ----------------------------<  129<H>  4.4   |  25  |  0.74    Ca    9.1      28 Sep 2023 22:30  Phos  3.2     09-28  Mg     2.0     09-28    TPro  7.5  /  Alb  3.7  /  TBili  0.5  /  DBili  x   /  AST  13<L>  /  ALT  22  /  AlkPhos  63  09-28    Lactate, Blood: 1.1 mmol/L (09-28 @ 22:30)  Lactate, Blood: 1.4 mmol/L (09-25 @ 21:15)    < from: CT Abdomen and Pelvis w/ Oral Cont and w/ IV Cont (09.29.23 @ 01:04) >    ACC: [id number] EXAM:  CT ABDOMEN AND PELVIS OC IC   ORDERED BY:    DENISE SERRATO     PROCEDURE DATE:  09/29/2023          INTERPRETATION:  CLINICAL INFORMATION: 48 years old. Female. SBO,   recurrent vomiting, pain.    COMPARISON: CT abdomen pelvis 9/25/2023    CONTRAST/COMPLICATIONS:  IV Contrast: Omnipaque 350  90 cc administered  10 cc discarded.  Oral Contrast: Omnipaque 300  Complications: None reported at time of study completion    PROCEDURE:  CT of the Abdomen and Pelvis was performed.  Sagittal and coronal reformats were performed.    FINDINGS:    LOWER CHEST: Within normal limits.    LIVER: Within normal limits.  BILE DUCTS: Normal caliber.  GALLBLADDER: Within normal limits.  SPLEEN: Within normal limits.  PANCREAS: Within normal limits.  ADRENALS: Within normal limits.  KIDNEYS/URETERS: Enhance symmetrically. No hydronephrosis. 3.1 cm   peripherally calcified left renal cyst unchanged.    BLADDER: Within normal limits.  REPRODUCTIVE ORGANS: Hysterectomy. 3 cm right adnexal cyst.    BOWEL: Status post gastric bypass. Small hiatal hernia. There are dilated   small bowel loops with transition point in the mid abdomen, with   mesenteric swirling. Appendix is unremarkable.  PERITONEUM: Trace ascites.  VESSELS: Normal caliber abdominal aorta.  RETROPERITONEUM/LYMPH NODES: No lymphadenopathy.  ABDOMINAL WALL: Within normal limits.  BONES: Degenerative changes in the spine. Status post L5-S1 posterior   fusion.    IMPRESSION:    High-grade small bowel obstruction, with mesenteric swirling; closed loop   obstruction cannot be excluded.    --- End of Report ---            ELENA DAMON MD; Attending Radiologist  This document has been electronically signed. Sep 29 2023  1:39AM    < end of copied text >

## 2023-09-29 NOTE — H&P ADULT - PROBLEM SELECTOR PLAN 1
- Admit to surgical service, Dr. Montesinos.  - Pt with VSSAF, exam with lower abdominal ttp, BW with leukocytosis 14k, CT with SBO.  - NPO/IVF  - Will place NGT if patient begins vomiting  - Pain regimen/supportive care    Discussed with Dr. Montesinos

## 2023-09-29 NOTE — H&P ADULT - ASSESSMENT
48yoF with PMHx hypothyroidism, SBO (04/2022) & PSH myomectomy x3, partial hysterectomy, gastric band (2007), gastric sleeve (2018), recently admitted for SBO 9/25-9/27 treated conservatively, presents with recurrent abdominal pain with nauesa and vomiting clear liquid after eating large lunch found to have SBO on CT A/P (3rd episode). In the ED pt with VSSAF, exam with mildly distended but soft abdomen with ttp in the lower abdomen, L>R. Leukocytosis to 14k.

## 2023-09-29 NOTE — H&P ADULT - HISTORY OF PRESENT ILLNESS
48yoF with PMHx hypothyroidism, SBO (04/2022) & PSH myomectomy x3, partial hysterectomy, gastric band (2007), gastric sleeve (2018), recently admitted for SBO 9/25-9/27 treated conservatively, presents to the ED with recurrent abdominal pain n/v after eating lunch yesterday at 1400hr. Patient states pain is similar to a few days ago, now localized to the RLQ. She reports BF yesterday AM, with BM and flatus, had toast for breakfast, then went to italian restaurant for lunch which consisted of meatballs and minestrone soup with potatoes. Patient states pain started upon arrival at home around 1400, with nausea and clear liquid vomit. Denies fever, chills, chest pain, sob, diarrhea, constipation, hematuria, dysuria.

## 2023-09-29 NOTE — DISCHARGE NOTE PROVIDER - HOSPITAL COURSE
HPI:  48yoF with PMHx hypothyroidism, SBO (04/2022) & PSH myomectomy x3, partial hysterectomy, gastric band (2007), gastric sleeve (2018), recently admitted for SBO 9/25-9/27 treated conservatively, presents to the ED with recurrent abdominal pain n/v after eating lunch yesterday at 1400hr. Patient states pain is similar to a few days ago, now localized to the RLQ. She reports BF yesterday AM, with BM and flatus, had toast for breakfast, then went to italian restaurant for lunch which consisted of meatballs and minestrone soup with potatoes. Patient states pain started upon arrival at home around 1400, with nausea and clear liquid vomit. Denies fever, chills, chest pain, sob, diarrhea, constipation, hematuria, dysuria.  (29 Sep 2023 02:11)    Hospital course:  Patient was admitted for High-Grade small bowel obstruction.  Patient was managed conservatively with bowel rest and slow diet advancement.  Patient is now evidencing bowel function with passing flatus and having bowel movements.  Patient is now voiding, ambulating, tolerating a regular diet, and stable for discharge.      Patient to follow up with Dr. Montesinos in 1 week.     HPI:  48yoF with PMHx hypothyroidism, SBO (04/2022) & PSH myomectomy x3, partial hysterectomy, gastric band (2007), gastric sleeve (2018), recently admitted for SBO 9/25-9/27 treated conservatively, presents to the ED with recurrent abdominal pain n/v after eating lunch yesterday at 1400hr. Patient states pain is similar to a few days ago, now localized to the RLQ. She reports BF yesterday AM, with BM and flatus, had toast for breakfast, then went to italian restaurant for lunch which consisted of meatballs and minestrone soup with potatoes. Patient states pain started upon arrival at home around 1400, with nausea and clear liquid vomit. Denies fever, chills, chest pain, sob, diarrhea, constipation, hematuria, dysuria.  (29 Sep 2023 02:11)    Hospital course:  Patient was admitted for High-Grade small bowel obstruction.  Patient was managed conservatively with bowel rest and slow diet advancement.  Patient is now evidencing bowel function with passing flatus and having bowel movements.  Patient is now voiding, ambulating, tolerating a regular diet, and stable for discharge. To continue a full liquid diet for the next three days and then advance slowly as tolerated.     Patient to follow up with Dr. Montesinos in 1 week.

## 2023-09-29 NOTE — H&P ADULT - NSHPPHYSICALEXAM_GEN_ALL_CORE
Vital Signs Last 24 Hrs  T(C): 37.3 (29 Sep 2023 01:56), Max: 37.3 (29 Sep 2023 01:56)  T(F): 99.1 (29 Sep 2023 01:56), Max: 99.1 (29 Sep 2023 01:56)  HR: 66 (29 Sep 2023 01:56) (60 - 66)  BP: 119/69 (29 Sep 2023 01:56) (119/69 - 127/84)  RR: 15 (29 Sep 2023 01:56) (15 - 16)  SpO2: 97% (29 Sep 2023 01:56) (97% - 100%)    Parameters below as of 29 Sep 2023 01:56  Patient On (Oxygen Delivery Method): room air    PHYSICAL EXAM:  CONSTITUTIONAL: No apparent distress, lying in bed  HEAD:  Atraumatic, normocephalic  EYES: EOMI, PERRLA, conjunctiva and sclera clear  ENMT: Oral mucosa with moist membranes. Normal dentition; no pharyngeal injection or exudates  NECK: Supple, symmetric and without tracheal deviation   RESP: No respiratory distress, no use of accessory muscles  CV: RRR; no peripheral edema  GI: Soft, mildly distended though more than when examined earlier in the night, TTP lower abdomen, no signs of lisa peritonitis, no rebound tenderness/guarding. Well healed surgical scars present on abdomen.   EXTREMITIES: 2+ peripheral pulses, no clubbing, cyanosis, or edema  PSYCH: A&O x3  NEUROLOGY: Non-focal, motor & sensory grossly intact  SKIN: Warm & dry, no rashes or lesions; no subcutaneous nodules or induration palpable

## 2023-09-30 LAB
ANION GAP SERPL CALC-SCNC: 6 MMOL/L — SIGNIFICANT CHANGE UP (ref 5–17)
BUN SERPL-MCNC: 8 MG/DL — SIGNIFICANT CHANGE UP (ref 7–23)
CALCIUM SERPL-MCNC: 8.3 MG/DL — LOW (ref 8.5–10.1)
CHLORIDE SERPL-SCNC: 110 MMOL/L — HIGH (ref 96–108)
CO2 SERPL-SCNC: 24 MMOL/L — SIGNIFICANT CHANGE UP (ref 22–31)
CREAT SERPL-MCNC: 0.5 MG/DL — SIGNIFICANT CHANGE UP (ref 0.5–1.3)
EGFR: 116 ML/MIN/1.73M2 — SIGNIFICANT CHANGE UP
GLUCOSE SERPL-MCNC: 84 MG/DL — SIGNIFICANT CHANGE UP (ref 70–99)
HCT VFR BLD CALC: 33 % — LOW (ref 34.5–45)
HGB BLD-MCNC: 11 G/DL — LOW (ref 11.5–15.5)
MAGNESIUM SERPL-MCNC: 2 MG/DL — SIGNIFICANT CHANGE UP (ref 1.6–2.6)
MCHC RBC-ENTMCNC: 28.6 PG — SIGNIFICANT CHANGE UP (ref 27–34)
MCHC RBC-ENTMCNC: 33.3 GM/DL — SIGNIFICANT CHANGE UP (ref 32–36)
MCV RBC AUTO: 85.7 FL — SIGNIFICANT CHANGE UP (ref 80–100)
NRBC # BLD: 0 /100 WBCS — SIGNIFICANT CHANGE UP (ref 0–0)
PHOSPHATE SERPL-MCNC: 2.9 MG/DL — SIGNIFICANT CHANGE UP (ref 2.5–4.5)
PLATELET # BLD AUTO: 215 K/UL — SIGNIFICANT CHANGE UP (ref 150–400)
POTASSIUM SERPL-MCNC: 3.8 MMOL/L — SIGNIFICANT CHANGE UP (ref 3.5–5.3)
POTASSIUM SERPL-SCNC: 3.8 MMOL/L — SIGNIFICANT CHANGE UP (ref 3.5–5.3)
RBC # BLD: 3.85 M/UL — SIGNIFICANT CHANGE UP (ref 3.8–5.2)
RBC # FLD: 12.2 % — SIGNIFICANT CHANGE UP (ref 10.3–14.5)
SODIUM SERPL-SCNC: 140 MMOL/L — SIGNIFICANT CHANGE UP (ref 135–145)
WBC # BLD: 5.69 K/UL — SIGNIFICANT CHANGE UP (ref 3.8–10.5)
WBC # FLD AUTO: 5.69 K/UL — SIGNIFICANT CHANGE UP (ref 3.8–10.5)

## 2023-09-30 PROCEDURE — 74018 RADEX ABDOMEN 1 VIEW: CPT | Mod: 26

## 2023-09-30 PROCEDURE — 99232 SBSQ HOSP IP/OBS MODERATE 35: CPT

## 2023-09-30 RX ORDER — ACETAMINOPHEN 500 MG
1000 TABLET ORAL ONCE
Refills: 0 | Status: COMPLETED | OUTPATIENT
Start: 2023-09-30 | End: 2023-09-30

## 2023-09-30 RX ADMIN — Medication 1000 MILLIGRAM(S): at 20:49

## 2023-09-30 RX ADMIN — Medication 400 MILLIGRAM(S): at 20:19

## 2023-09-30 RX ADMIN — SODIUM CHLORIDE 75 MILLILITER(S): 9 INJECTION, SOLUTION INTRAVENOUS at 20:19

## 2023-09-30 RX ADMIN — Medication 120 MICROGRAM(S): at 21:26

## 2023-09-30 RX ADMIN — PANTOPRAZOLE SODIUM 40 MILLIGRAM(S): 20 TABLET, DELAYED RELEASE ORAL at 12:13

## 2023-09-30 NOTE — PROGRESS NOTE ADULT - ASSESSMENT
49 yo female here with pmhx of hypothyroidism, multiple abdominal surgery, here with small bowel obstruction.  Currently much improved since admission with passage of flatus/BM.  Currently NPO.   49 yo female here with pmhx of hypothyroidism, multiple abdominal surgery, here with small bowel obstruction.  Currently much improved since admission with passage of flatus/BM.  Currently NPO.    Surg. Att.  Pt. is well. Has full GI function. Free of pain. Abd flat and soft.  Clears started.

## 2023-10-01 LAB
ANION GAP SERPL CALC-SCNC: 5 MMOL/L — SIGNIFICANT CHANGE UP (ref 5–17)
BUN SERPL-MCNC: 6 MG/DL — LOW (ref 7–23)
CALCIUM SERPL-MCNC: 8.6 MG/DL — SIGNIFICANT CHANGE UP (ref 8.5–10.1)
CHLORIDE SERPL-SCNC: 109 MMOL/L — HIGH (ref 96–108)
CO2 SERPL-SCNC: 25 MMOL/L — SIGNIFICANT CHANGE UP (ref 22–31)
CREAT SERPL-MCNC: 0.61 MG/DL — SIGNIFICANT CHANGE UP (ref 0.5–1.3)
CULTURE RESULTS: SIGNIFICANT CHANGE UP
CULTURE RESULTS: SIGNIFICANT CHANGE UP
EGFR: 110 ML/MIN/1.73M2 — SIGNIFICANT CHANGE UP
GLUCOSE SERPL-MCNC: 95 MG/DL — SIGNIFICANT CHANGE UP (ref 70–99)
MAGNESIUM SERPL-MCNC: 2.1 MG/DL — SIGNIFICANT CHANGE UP (ref 1.6–2.6)
PHOSPHATE SERPL-MCNC: 3 MG/DL — SIGNIFICANT CHANGE UP (ref 2.5–4.5)
POTASSIUM SERPL-MCNC: 3.8 MMOL/L — SIGNIFICANT CHANGE UP (ref 3.5–5.3)
POTASSIUM SERPL-SCNC: 3.8 MMOL/L — SIGNIFICANT CHANGE UP (ref 3.5–5.3)
SODIUM SERPL-SCNC: 139 MMOL/L — SIGNIFICANT CHANGE UP (ref 135–145)
SPECIMEN SOURCE: SIGNIFICANT CHANGE UP
SPECIMEN SOURCE: SIGNIFICANT CHANGE UP

## 2023-10-01 RX ADMIN — Medication 120 MICROGRAM(S): at 22:18

## 2023-10-01 RX ADMIN — PANTOPRAZOLE SODIUM 40 MILLIGRAM(S): 20 TABLET, DELAYED RELEASE ORAL at 11:24

## 2023-10-01 NOTE — CARE COORDINATION ASSESSMENT. - NSCAREPROVIDERS_GEN_ALL_CORE_FT
CARE PROVIDERS:  Accepting Physician: Danny Montesinos  Admitting: Danny Montesinos  Attending: Danny Montesinos  Case Management: Alejandra Fernandez  Consultant: Cathy Sweet  Covering Team: Ck Jaimes  Covering Team: Massimo Lucio  ED Attending: Chung Hartmann  ED Attending2: Easton Ross  ED Nurse: Roberta Graf  Nurse: Dion Khan  Nurse: Bassam Crotf  Ordered: ADM, User  Outpatient Provider: Carlos Galvez  PCA/Nursing Assistant: Yanet Mcfarland  Primary Team: Lilo Gonzalez  Primary Team: Bryce Jaquez  Registered Dietitian: Georgina Cruz

## 2023-10-01 NOTE — CARE COORDINATION ASSESSMENT. - OTHER PERTINENT DISCHARGE PLANNING INFORMATION:
Met with patient at bedside. Role of CM/transition planning explained. Patient verbalizes understanding. Transition planning information provided. Patient lives in private house with her , 4 BELKYS. Patient is independent with all ADL's and ambulation, drives PTA. No needs/HCS present PTA. no new needs anticipated. Patient's  Luciano will transport patient home. CM will continue to follow throughout hospital stay. CM contact information provided.

## 2023-10-01 NOTE — PROGRESS NOTE ADULT - PROBLEM SELECTOR PLAN 1
Advance diet to full liquids.   Will stop IV fluids.   Ambulation/OOB  Analgesia prn  Anti emetics prn  Pending AM labs.   Will discuss with Dr. Jaimes (Covering for Dr. Montesinos.)
Currently NPO- Will advance to sips and chips, possible clears for today  Ambulation/OOB  IV fluids  Pending Am labs  Analgesia/anti emetics prn  Will discuss with Dr. Jaimes (Covering for Dr. Montesinos)

## 2023-10-01 NOTE — CARE COORDINATION ASSESSMENT. - NSPASTMEDSURGHISTORY_GEN_ALL_CORE_FT
PAST MEDICAL & SURGICAL HISTORY:  Heart Murmur  Patient state she was told she no longer has this      Hypothyroidism       Section       GERD (gastroesophageal reflux disease)      Morbid obesity  resolved with weight loss surgery      H/O abdominoplasty        H/O: hysterectomy  2014 fibroids      H/O myomectomy  2001      H/O bilateral breast reduction surgery  with breast implants  silicone      H/O spinal fusion  10/2017      S/P laparoscopic sleeve gastrectomy  2018      H/O laparoscopic adjustable gastric banding      SBO (small bowel obstruction)

## 2023-10-01 NOTE — PROGRESS NOTE ADULT - ASSESSMENT
47 yo female here with pmhx of Hyperthyroidism, SBO, multiple abdominal surgeries, presenting with resolving SBO.  Currently tolerating a clear liquid diet. Ambulating, voiding, passing flatus, having BM.   47 yo female here with pmhx of Hyperthyroidism, SBO, multiple abdominal surgeries, presenting with resolving SBO.  Currently tolerating a clear liquid diet. Ambulating, voiding, passing flatus, having BM.   Surg.Att.  Pt. Doing well. Has no complaints. Will advance diet.

## 2023-10-01 NOTE — CARE COORDINATION ASSESSMENT. - NS SW READMITTED REASON
Patient was here in [facility]  from 9/25-9/27 w/ abd pain/medical/surgical complications

## 2023-10-02 ENCOUNTER — TRANSCRIPTION ENCOUNTER (OUTPATIENT)
Age: 48
End: 2023-10-02

## 2023-10-02 VITALS
RESPIRATION RATE: 18 BRPM | TEMPERATURE: 98 F | SYSTOLIC BLOOD PRESSURE: 106 MMHG | HEART RATE: 73 BPM | OXYGEN SATURATION: 95 % | DIASTOLIC BLOOD PRESSURE: 67 MMHG

## 2023-10-02 LAB
ANION GAP SERPL CALC-SCNC: 4 MMOL/L — LOW (ref 5–17)
BUN SERPL-MCNC: 6 MG/DL — LOW (ref 7–23)
CALCIUM SERPL-MCNC: 8.2 MG/DL — LOW (ref 8.5–10.1)
CHLORIDE SERPL-SCNC: 110 MMOL/L — HIGH (ref 96–108)
CO2 SERPL-SCNC: 27 MMOL/L — SIGNIFICANT CHANGE UP (ref 22–31)
CREAT SERPL-MCNC: 0.63 MG/DL — SIGNIFICANT CHANGE UP (ref 0.5–1.3)
EGFR: 109 ML/MIN/1.73M2 — SIGNIFICANT CHANGE UP
GLUCOSE SERPL-MCNC: 84 MG/DL — SIGNIFICANT CHANGE UP (ref 70–99)
HCT VFR BLD CALC: 32 % — LOW (ref 34.5–45)
HGB BLD-MCNC: 10.9 G/DL — LOW (ref 11.5–15.5)
MCHC RBC-ENTMCNC: 28.9 PG — SIGNIFICANT CHANGE UP (ref 27–34)
MCHC RBC-ENTMCNC: 34.1 GM/DL — SIGNIFICANT CHANGE UP (ref 32–36)
MCV RBC AUTO: 84.9 FL — SIGNIFICANT CHANGE UP (ref 80–100)
NRBC # BLD: 0 /100 WBCS — SIGNIFICANT CHANGE UP (ref 0–0)
PLATELET # BLD AUTO: 218 K/UL — SIGNIFICANT CHANGE UP (ref 150–400)
POTASSIUM SERPL-MCNC: 3.9 MMOL/L — SIGNIFICANT CHANGE UP (ref 3.5–5.3)
POTASSIUM SERPL-SCNC: 3.9 MMOL/L — SIGNIFICANT CHANGE UP (ref 3.5–5.3)
RBC # BLD: 3.77 M/UL — LOW (ref 3.8–5.2)
RBC # FLD: 12.1 % — SIGNIFICANT CHANGE UP (ref 10.3–14.5)
SODIUM SERPL-SCNC: 141 MMOL/L — SIGNIFICANT CHANGE UP (ref 135–145)
WBC # BLD: 7.95 K/UL — SIGNIFICANT CHANGE UP (ref 3.8–10.5)
WBC # FLD AUTO: 7.95 K/UL — SIGNIFICANT CHANGE UP (ref 3.8–10.5)

## 2023-10-02 PROCEDURE — 85025 COMPLETE CBC W/AUTO DIFF WBC: CPT

## 2023-10-02 PROCEDURE — 85730 THROMBOPLASTIN TIME PARTIAL: CPT

## 2023-10-02 PROCEDURE — 74019 RADEX ABDOMEN 2 VIEWS: CPT

## 2023-10-02 PROCEDURE — 86850 RBC ANTIBODY SCREEN: CPT

## 2023-10-02 PROCEDURE — 86901 BLOOD TYPING SEROLOGIC RH(D): CPT

## 2023-10-02 PROCEDURE — 74177 CT ABD & PELVIS W/CONTRAST: CPT | Mod: MA

## 2023-10-02 PROCEDURE — 83735 ASSAY OF MAGNESIUM: CPT

## 2023-10-02 PROCEDURE — 85027 COMPLETE CBC AUTOMATED: CPT

## 2023-10-02 PROCEDURE — 86900 BLOOD TYPING SEROLOGIC ABO: CPT

## 2023-10-02 PROCEDURE — 80053 COMPREHEN METABOLIC PANEL: CPT

## 2023-10-02 PROCEDURE — 84702 CHORIONIC GONADOTROPIN TEST: CPT

## 2023-10-02 PROCEDURE — 80048 BASIC METABOLIC PNL TOTAL CA: CPT

## 2023-10-02 PROCEDURE — 99285 EMERGENCY DEPT VISIT HI MDM: CPT | Mod: 25

## 2023-10-02 PROCEDURE — 96375 TX/PRO/DX INJ NEW DRUG ADDON: CPT

## 2023-10-02 PROCEDURE — 74018 RADEX ABDOMEN 1 VIEW: CPT

## 2023-10-02 PROCEDURE — 83605 ASSAY OF LACTIC ACID: CPT

## 2023-10-02 PROCEDURE — 84100 ASSAY OF PHOSPHORUS: CPT

## 2023-10-02 PROCEDURE — 85610 PROTHROMBIN TIME: CPT

## 2023-10-02 PROCEDURE — 96374 THER/PROPH/DIAG INJ IV PUSH: CPT

## 2023-10-02 PROCEDURE — 87635 SARS-COV-2 COVID-19 AMP PRB: CPT

## 2023-10-02 PROCEDURE — 36415 COLL VENOUS BLD VENIPUNCTURE: CPT

## 2023-10-02 NOTE — PROGRESS NOTE ADULT - ASSESSMENT
47 y/o F w/ Hx of hypothyroidism, s/p Gastric Sleeve and SBO, admitted with a small bowel obstruction that has now resolved.      1. Continue FLD for now  2. OOB and ambulating  3. d/c planning  4. Discussed with Dr. Montesinos

## 2023-10-02 NOTE — DISCHARGE NOTE NURSING/CASE MANAGEMENT/SOCIAL WORK - PATIENT PORTAL LINK FT
You can access the FollowMyHealth Patient Portal offered by [facility] by registering at the following website: [email or web address]. By joining [facility]’s FollowMyHealth portal, you will also be able to view your health information using other applications (apps) compatible with our system.

## 2023-10-02 NOTE — DISCHARGE NOTE NURSING/CASE MANAGEMENT/SOCIAL WORK - NSPROMEDSBROUGHTTOHOSP_GEN_A_NUR
After writer read pt's notes it was determined that ACP documents could not be completed due to pt having an appointed guardian.         07/22/23 [phone]   Encounter Summary   Encounter Overview/Reason  Spiritual/Emotional Needs   Service Provided For: Patient   Referral/Consult From: Nurse   Last Encounter  07/22/23   Complexity of Encounter Low   Spiritual/Emotional needs   Type Spiritual Support   Assessment/Intervention/Outcome   Assessment Unable to assess   Intervention Other (Comment)  (Spoke with RN) no

## 2023-10-02 NOTE — DISCHARGE NOTE NURSING/CASE MANAGEMENT/SOCIAL WORK - NSDCPEFALRISK_GEN_ALL_CORE
For information on Fall & Injury Prevention, visit: [email or web address] OR  [email or web address] OR  https://www.cdc.gov/steadi/patient.html

## 2023-10-02 NOTE — PROGRESS NOTE ADULT - SUBJECTIVE AND OBJECTIVE BOX
GENERAL SURGERY PROGRESS NOTE      SUBJECTIVE:  Patient examined at bedside, no complaints.   No nausea, no vomiting  Tolerating full liquid diet  Passing flatus and moving her bowels  Asking to go home        OBJECTIVE:  VITALS:  T(F): 98.2 (10-02-23 @ 05:25), Max: 98.2 (10-01-23 @ 23:57)  HR: 73 (10-02-23 @ 05:25) (65 - 78)  BP: 106/67 (10-02-23 @ 05:25) (103/63 - 117/79)  RR: 18 (10-02-23 @ 05:25) (17 - 18)  SpO2: 95% (10-02-23 @ 05:25) (95% - 96%)        10-01 @ 07:01  -  10-02 @ 07:00  --------------------------------------------------------  IN:    Lactated Ringers: 900 mL    Oral Fluid: 420 mL  Total IN: 1320 mL    OUT:  Total OUT: 0 mL    Total NET: 1320 mL      LABS:  none available at time of note writing      PHYSICAL EXAM:   General: AAO x3, No acute distress  Skin: No jaundice, no icterus  Respiratory: clear to auscultation bilaterally, no wheezes / rhonchi / rales, trachea midline  Cardiac: S1 & S2 present, rate and rhythm are regular  Abdomen: soft, nontender, nondistended, no rebound tenderness, no guarding, no palpable masses  Extremities: non edematous, no calf pain bilaterally  
Hospital day:  1    48y Female admitted with Intestinal obstruction      Patient seen and examined bedside resting comfortably.  No complaints offered.   States she feels significantly  better.  Currently NPO.  Passing flatus/BM.  No further complaints at this time.  Pt aware we will go slow with advancement of diet- patient aware.     T(F): 98.4 (09-30-23 @ 04:46), Max: 98.4 (09-29-23 @ 09:39)  HR: 61 (09-30-23 @ 04:46) (61 - 62)  BP: 100/61 (09-30-23 @ 04:46) (100/61 - 115/68)  RR: 18 (09-30-23 @ 04:46) (17 - 18)  SpO2: 94% (09-30-23 @ 04:46) (94% - 97%)  Wt(kg): --  CAPILLARY BLOOD GLUCOSE          PHYSICAL EXAM:  General: NAD  Neuro:  Alert & oriented   Abdomen: BS+ Soft. ND. NT.    Extremities: no pedal edema or calf tenderness noted         LABS:                        12.2   11.56 )-----------( 271      ( 29 Sep 2023 04:45 )             36.0     09-29    138  |  107  |  14  ----------------------------<  97  3.9   |  22  |  0.62    Ca    8.3<L>      29 Sep 2023 04:45  Phos  3.2     09-28  Mg     2.0     09-28    TPro  7.5  /  Alb  3.7  /  TBili  0.5  /  DBili  x   /  AST  13<L>  /  ALT  22  /  AlkPhos  63  09-28    PT/INR - ( 28 Sep 2023 22:30 )   PT: 10.4 sec;   INR: 0.89 ratio         PTT - ( 28 Sep 2023 22:30 )  PTT:31.3 sec  I&O's Detail    29 Sep 2023 07:01  -  30 Sep 2023 07:00  --------------------------------------------------------  IN:    Lactated Ringers: 2100 mL  Total IN: 2100 mL    OUT:    Voided (mL): 300 mL  Total OUT: 300 mL    Total NET: 1800 mL            RADIOLOGY:    
Hospital day: 3    48y Female admitted with Intestinal obstruction      Patient seen and examined bedside resting comfortably.  No complaints offered. No overnight events.  Pt is tolerating clear liquids.  Ambulating, voiding, passing flatus and having BM.  Pt overall feels well, and is ok with going slow with her diet advancement.     T(F): 98.5 (10-01-23 @ 04:57), Max: 98.5 (10-01-23 @ 04:57)  HR: 66 (10-01-23 @ 04:57) (61 - 66)  BP: 110/71 (10-01-23 @ 04:57) (105/67 - 118/75)  RR: 17 (10-01-23 @ 04:57) (16 - 18)  SpO2: 94% (10-01-23 @ 04:57) (94% - 96%)  Wt(kg): --  CAPILLARY BLOOD GLUCOSE          PHYSICAL EXAM:  General: NAD  Neuro:  Alert & oriented  Abdomen: soft, NT, ND. BS+  Extremities: no pedal edema or calf tenderness noted       LABS:                        11.0   5.69  )-----------( 215      ( 30 Sep 2023 08:40 )             33.0     10-01    139  |  109<H>  |  6<L>  ----------------------------<  95  3.8   |  25  |  0.61    Ca    8.6      01 Oct 2023 08:10  Phos  3.0     10-01  Mg     2.1     10-01        I&O's Detail    30 Sep 2023 07:01  -  01 Oct 2023 07:00  --------------------------------------------------------  IN:    IV PiggyBack: 900 mL  Total IN: 900 mL    OUT:  Total OUT: 0 mL    Total NET: 900 mL            RADIOLOGY:

## 2024-06-05 NOTE — DIETITIAN INITIAL EVALUATION ADULT - PERTINENT LABORATORY DATA
Yani Cabalelro is a 59 year old female presenting to the walk-in clinic today with prashanth  for evaluation alcohol withdrawals. Patient had an alcohol mayen over weekend. 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 Took chlordiazepoxide prior to arrival. Last drink was 5pm yesterday. Denies having seizures from alcohol withdrawals.      Treatment tried prior to visit: Chlordiazepoxide     Swabs/Specimens collected during rooming process:  None    Work, School or  note needed: No    New vs Established: Established    Patient would like communication of their results via:    [employer]     04-19    137  |  107  |  15  ----------------------------<  82  3.5   |  24  |  0.67    Ca    8.4      19 Apr 2022 07:39    TPro  5.7<L>  /  Alb  3.0<L>  /  TBili  0.6  /  DBili  x   /  AST  13  /  ALT  18  /  AlkPhos  48  04-19   [id number]:Routine;

## 2025-03-24 NOTE — H&P PST ADULT - DOES PATIENT HAVE ADVANCE DIRECTIVE
Medication: Atorvastatin passed protocol.   Last office visit date: 7/17/24  Next appointment scheduled?: Yes   Number of refills given: 0  3/2024 labs    Yes

## 2025-07-16 NOTE — PROGRESS NOTE ADULT - PROBLEM SELECTOR PROBLEM 1
Physical Therapy     Chart reviewed and spoke with OT who discussed with nurse this AM. Pt is currently intubated and sedated, not following commands/not appropriate for skilled therapy services at this time. Will defer and continue to follow as medically appropriate and available.    Teressa Owens PT, DPT, NCS   Morbid obesity

## (undated) DEVICE — GLV 7.5 PROTEXIS (WHITE)

## (undated) DEVICE — DRSG GAUZE MOISTURIZER 0.5 OZ 4X8

## (undated) DEVICE — DRAIN JACKSON PRATT 7MM FLAT FULL NO TROCAR

## (undated) DEVICE — DRAPE TOWEL BLUE 17" X 24"

## (undated) DEVICE — DRAPE TOP SHEET 53" X 101"

## (undated) DEVICE — DRSG STOCKINETTE IMPERVIOUS XL

## (undated) DEVICE — PACK BREAST

## (undated) DEVICE — DRSG XEROFORM 5 X 9"

## (undated) DEVICE — DRAPE MEDIUM SHEET 44" X 70"

## (undated) DEVICE — DRSG KLING 4"

## (undated) DEVICE — POSITIONER FOAM EGG CRATE ULNAR 2PCS (PINK)

## (undated) DEVICE — SLV COMPRESSION KNEE MED

## (undated) DEVICE — WARMING BLANKET LOWER ADULT

## (undated) DEVICE — DRAIN RESERVOIR FOR JACKSON PRATT 100CC CARDINAL

## (undated) DEVICE — ELCTR PENCIL SMOKE EVACUATOR COATED PUSH BUTTON 70MM

## (undated) DEVICE — DRSG DERMABOND 0.7ML

## (undated) DEVICE — PREP BETADINE SPONGE STICKS

## (undated) DEVICE — ELCTR STRYKER NEPTUNE BLADE COATED, INSULATED 70MM

## (undated) DEVICE — DURABLE MEDICAL EQUIPMENT: Type: DURABLE MEDICAL EQUIPMENT

## (undated) DEVICE — SUT MONOCRYL 4-0 18" PS-2

## (undated) DEVICE — DRSG ACE BANDAGE 6"

## (undated) DEVICE — STAPLER SKIN VISI-STAT 35 WIDE

## (undated) DEVICE — WARMING BLANKET FULL UNDERBODY

## (undated) DEVICE — SUT MONOCRYL 3-0 18" PS-1

## (undated) DEVICE — DRSG DERMABOND PRINEO 60CM